# Patient Record
Sex: FEMALE | Race: ASIAN | Employment: OTHER | URBAN - METROPOLITAN AREA
[De-identification: names, ages, dates, MRNs, and addresses within clinical notes are randomized per-mention and may not be internally consistent; named-entity substitution may affect disease eponyms.]

---

## 2017-01-12 ENCOUNTER — OFFICE VISIT (OUTPATIENT)
Dept: UROLOGY | Facility: HOSPITAL | Age: 72
End: 2017-01-12
Attending: OBSTETRICS & GYNECOLOGY
Payer: MEDICARE

## 2017-01-12 VITALS
HEIGHT: 58.5 IN | SYSTOLIC BLOOD PRESSURE: 110 MMHG | WEIGHT: 111 LBS | DIASTOLIC BLOOD PRESSURE: 70 MMHG | BODY MASS INDEX: 22.68 KG/M2

## 2017-01-12 DIAGNOSIS — N95.2 POSTMENOPAUSAL ATROPHIC VAGINITIS: ICD-10-CM

## 2017-01-12 DIAGNOSIS — N81.2 UTEROVAGINAL PROLAPSE, INCOMPLETE: Primary | ICD-10-CM

## 2017-01-12 PROCEDURE — 99211 OFF/OP EST MAY X REQ PHY/QHP: CPT

## 2017-01-12 NOTE — PROGRESS NOTES
.Patient here for pessary check, denies any c/o leakage, vaginal spotting/discharge. Patient happy with pessary. #3 incontinence dish  pessary removed and cleaned. Speculum exam revealed pink moist tissue, no open areas or lesions noted.   Lavage with ha

## 2017-02-02 ENCOUNTER — OFFICE VISIT (OUTPATIENT)
Dept: UROLOGY | Facility: HOSPITAL | Age: 72
End: 2017-02-02
Attending: OBSTETRICS & GYNECOLOGY
Payer: MEDICARE

## 2017-02-02 VITALS
DIASTOLIC BLOOD PRESSURE: 70 MMHG | BODY MASS INDEX: 22.68 KG/M2 | WEIGHT: 111 LBS | SYSTOLIC BLOOD PRESSURE: 126 MMHG | HEIGHT: 58.5 IN

## 2017-02-02 DIAGNOSIS — N39.3 FEMALE STRESS INCONTINENCE: ICD-10-CM

## 2017-02-02 DIAGNOSIS — N81.2 UTEROVAGINAL PROLAPSE, INCOMPLETE: Primary | ICD-10-CM

## 2017-02-02 PROCEDURE — 99211 OFF/OP EST MAY X REQ PHY/QHP: CPT

## 2017-02-02 NOTE — PROCEDURES
.Patient here for pessary check, pessary fell out after bm this week, reports having some constipation recently, taking metamucil daily, reviewed bowel regimen and addition of Miralax when constipated.   Pt  denies any c/o leakage, vaginal spotting/discharg

## 2017-02-02 NOTE — PATIENT INSTRUCTIONS
[id number] [address] [facility]IC[facility]    BOWEL REGIMEN    Constipation can have detrimental effects on bladder function and can worsen the symptoms of prolapse. It is important to avoid constipation.     The first step for treating constipation is to i

## 2017-02-27 ENCOUNTER — TELEPHONE (OUTPATIENT)
Dept: FAMILY MEDICINE CLINIC | Facility: CLINIC | Age: 72
End: 2017-02-27

## 2017-02-27 NOTE — TELEPHONE ENCOUNTER
FYI - Son called and asked for Thursday appt to be moved to earlier time - 9 or 9:30. Appt was MA Supervisit with blood pressure check and med refill. Pt will be out of town for next 3 months.

## 2017-02-27 NOTE — TELEPHONE ENCOUNTER
Future Appointments    Patient Cancelled (Requested an appt around 9 or 9:30, if no appt, needed to cancel )

## 2017-03-02 ENCOUNTER — OFFICE VISIT (OUTPATIENT)
Dept: UROLOGY | Facility: HOSPITAL | Age: 72
End: 2017-03-02
Attending: OBSTETRICS & GYNECOLOGY
Payer: MEDICARE

## 2017-03-02 VITALS
DIASTOLIC BLOOD PRESSURE: 64 MMHG | WEIGHT: 111 LBS | BODY MASS INDEX: 22.68 KG/M2 | HEIGHT: 58.5 IN | SYSTOLIC BLOOD PRESSURE: 132 MMHG

## 2017-03-02 DIAGNOSIS — N81.2 UTEROVAGINAL PROLAPSE, INCOMPLETE: Primary | ICD-10-CM

## 2017-03-02 DIAGNOSIS — N95.2 POSTMENOPAUSAL ATROPHIC VAGINITIS: ICD-10-CM

## 2017-03-02 PROCEDURE — 99211 OFF/OP EST MAY X REQ PHY/QHP: CPT

## 2017-03-02 NOTE — PATIENT INSTRUCTIONS
[address] [address] [facility]IC[facility]    BOWEL REGIMEN    Constipation can have detrimental effects on bladder function and can worsen the symptoms of prolapse. It is important to avoid constipation.     The first step for treating constipation is to i

## 2017-03-02 NOTE — PROCEDURES
.Patient here for pessary check, denies any c/o leakage, vaginal spotting/discharge. Patient happy with pessary. Pt removed pessary on own prior to visit to practice. Marcelino Orta Speculum exam revealed pink moist tissue, no open areas or lesions noted.   Scant oozi

## 2017-03-10 ENCOUNTER — PATIENT OUTREACH (OUTPATIENT)
Dept: FAMILY MEDICINE CLINIC | Facility: CLINIC | Age: 72
End: 2017-03-10

## 2017-03-10 NOTE — PROGRESS NOTES
Patient outreach initiated to schedule MAPS. Called home number and left message to call office.      Last MAPS: 5/19/16    [phone] (home)

## 2017-03-21 ENCOUNTER — TELEPHONE (OUTPATIENT)
Dept: FAMILY MEDICINE CLINIC | Facility: CLINIC | Age: 72
End: 2017-03-21

## 2017-03-21 DIAGNOSIS — I10 ESSENTIAL HYPERTENSION WITH GOAL BLOOD PRESSURE LESS THAN 140/90: Primary | ICD-10-CM

## 2017-03-22 RX ORDER — AMLODIPINE BESYLATE 5 MG/1
5 TABLET ORAL DAILY
Qty: 30 TABLET | Refills: 0 | Status: SHIPPED | OUTPATIENT
Start: 2017-03-22 | End: 2017-06-14

## 2017-03-22 NOTE — TELEPHONE ENCOUNTER
No future appointments. Return in about 3 months (around 3/1/2017). LOV 12/16     LAST LAB 9/16     LAST RX   AmLODIPine Besylate 5 MG Oral Tab 90 tablet 0 12/1/2016         PROTOCOL  Hypertension Medications Protocol Passed    Please advise on refill.

## 2017-03-23 NOTE — TELEPHONE ENCOUNTER
Called patient to schedule an appointment and son (okay to speak with per Lakesha Mina) stated that Sheila Bermeo is out of the state for 2 more months. She will make an appointment when she returns. He would like to know if we can call in refill for 2 months?   He stat

## 2017-03-28 NOTE — PROGRESS NOTES
Patient outreach to schedule MAPS. LVM on son's voice mail. I know the patient is supposed to be out of town - reaching out to see when she will return.

## 2017-04-03 NOTE — TELEPHONE ENCOUNTER
No future appointments. LOV 12/16    LAST DEXA 4/15  Due now. LAST RX    ALENDRONATE SODIUM 70 MG Oral Tab 12 tablet 1 10/13/2016       PROTOCOL  ALENDRONATE SODIUM 70 MG Oral Tab 12 tablet 1 10/13/2016       Note Pharmacy [facility].      Will give 2 ref

## 2017-04-04 RX ORDER — ALENDRONATE SODIUM 70 MG/1
TABLET ORAL
Qty: 4 TABLET | Refills: 1 | Status: SHIPPED | OUTPATIENT
Start: 2017-04-04 | End: 2017-04-04

## 2017-04-04 RX ORDER — ALENDRONATE SODIUM 70 MG/1
TABLET ORAL
Qty: 12 TABLET | Refills: 1 | Status: SHIPPED | OUTPATIENT
Start: 2017-04-04 | End: 2017-06-21

## 2017-04-04 NOTE — TELEPHONE ENCOUNTER
Did refill x 2 months.       Ifrah Ryan at 3/23/2017  1:30 PM      Status: Signed : Elham King All Collapse All    Called patient to schedule an appointment and son (okay to speak with per [facility]) stated that Lasha Silverman is out of the state for

## 2017-05-22 RX ORDER — LEVOTHYROXINE SODIUM 0.03 MG/1
TABLET ORAL
Qty: 90 TABLET | Refills: 1 | Status: SHIPPED | OUTPATIENT
Start: 2017-05-22 | End: 2017-11-09

## 2017-05-22 NOTE — TELEPHONE ENCOUNTER
No future appointments. LOV 12/16     LAST LAB 11/16    LAST RX   Levothyroxine Sodium 25 MCG Oral Tab 90 tablet 3 5/19/2016       PROTOCOL  Thyroid Supplements Protocol Passed    Refilled x 6 months.

## 2017-06-15 RX ORDER — AMLODIPINE BESYLATE 5 MG/1
TABLET ORAL
Qty: 15 TABLET | Refills: 0 | Status: SHIPPED | OUTPATIENT
Start: 2017-06-15 | End: 2017-06-21

## 2017-06-15 NOTE — TELEPHONE ENCOUNTER
Future Appointments  Date Time Provider [facility]   6/21/2017 6:00 PM Kim Ackerman MD [facility] [address]       LOV  12/16    LAST LAB    LAST RX   AmLODIPine Besylate 5 MG Oral Tab 30 tablet 0 3/22/2017 6/20/2017      Sig :  Take 1 tablet (5 mg

## 2017-06-16 RX ORDER — AMLODIPINE BESYLATE 5 MG/1
TABLET ORAL
Qty: 90 TABLET | Refills: 0 | OUTPATIENT
Start: 2017-06-16

## 2017-06-16 NOTE — TELEPHONE ENCOUNTER
Future Appointments  Date Time Provider [facility]   6/21/2017 6:00 PM Dustin Mccall MD [facility] [address]     LOV    LAST LAB    LAST RX   AMLODIPINE BESYLATE 5 MG Oral Tab 15 tablet 0 6/15/2017         PROTOCOL  Hypertension Medications Jose

## 2017-06-21 ENCOUNTER — OFFICE VISIT (OUTPATIENT)
Dept: FAMILY MEDICINE CLINIC | Facility: CLINIC | Age: 72
End: 2017-06-21

## 2017-06-21 VITALS
BODY MASS INDEX: 22.88 KG/M2 | TEMPERATURE: 99 F | DIASTOLIC BLOOD PRESSURE: 84 MMHG | HEART RATE: 62 BPM | OXYGEN SATURATION: 98 % | SYSTOLIC BLOOD PRESSURE: 138 MMHG | WEIGHT: 112 LBS | HEIGHT: 58.5 IN | RESPIRATION RATE: 14 BRPM

## 2017-06-21 DIAGNOSIS — N81.10 FEMALE BLADDER PROLAPSE: ICD-10-CM

## 2017-06-21 DIAGNOSIS — D50.8 IRON DEFICIENCY ANEMIA SECONDARY TO INADEQUATE DIETARY IRON INTAKE: ICD-10-CM

## 2017-06-21 DIAGNOSIS — K21.9 GASTROESOPHAGEAL REFLUX DISEASE WITHOUT ESOPHAGITIS: ICD-10-CM

## 2017-06-21 DIAGNOSIS — Z00.00 ENCOUNTER FOR ANNUAL HEALTH EXAMINATION: Primary | ICD-10-CM

## 2017-06-21 DIAGNOSIS — S42.302D: ICD-10-CM

## 2017-06-21 DIAGNOSIS — Z12.31 VISIT FOR SCREENING MAMMOGRAM: ICD-10-CM

## 2017-06-21 DIAGNOSIS — I10 ESSENTIAL HYPERTENSION, BENIGN: ICD-10-CM

## 2017-06-21 DIAGNOSIS — Z12.11 SCREENING FOR COLON CANCER: ICD-10-CM

## 2017-06-21 DIAGNOSIS — Z78.0 POSTMENOPAUSAL: ICD-10-CM

## 2017-06-21 DIAGNOSIS — H91.93 HEARING DIFFICULTY OF BOTH EARS: ICD-10-CM

## 2017-06-21 DIAGNOSIS — Z13.6 SCREENING FOR CARDIOVASCULAR CONDITION: ICD-10-CM

## 2017-06-21 DIAGNOSIS — M85.80 OSTEOPENIA, UNSPECIFIED LOCATION: ICD-10-CM

## 2017-06-21 DIAGNOSIS — E03.9 HYPOTHYROIDISM, UNSPECIFIED TYPE: ICD-10-CM

## 2017-06-21 PROCEDURE — G0439 PPPS, SUBSEQ VISIT: HCPCS | Performed by: FAMILY MEDICINE

## 2017-06-21 PROCEDURE — 96160 PT-FOCUSED HLTH RISK ASSMT: CPT | Performed by: FAMILY MEDICINE

## 2017-06-21 RX ORDER — ALENDRONATE SODIUM 70 MG/1
TABLET ORAL
Qty: 12 TABLET | Refills: 3 | Status: SHIPPED | OUTPATIENT
Start: 2017-06-21 | End: 2018-02-01

## 2017-06-21 RX ORDER — AMLODIPINE BESYLATE 5 MG/1
5 TABLET ORAL
Qty: 90 TABLET | Refills: 1 | Status: SHIPPED | OUTPATIENT
Start: 2017-06-21 | End: 2018-02-01

## 2017-06-21 NOTE — PATIENT INSTRUCTIONS
Jane Sleeper SCREENING SCHEDULE   Tests on this list are recommended by your physician but may not be covered, or covered at this frequency, by your insurer. Please check with your insurance carrier before scheduling to verify coverage.    PREVENT Colorectal Cancer Screening  Covered up to Age 76     Colonoscopy Screen   Covered every 10 years- more often if abnormal Colonoscopy,10 Years due on 01/20/2021 Update Health Maintenance if applicable    Flex Sigmoidoscopy Screen  Covered every 5 years N VACC PRSV FREE INC ANTIG   -ADMIN INFLUENZA VIRUS VAC    Please get every year    Pneumococcal 13 (Prevnar)  Covered Once after 65   Orders placed or performed in visit on 10/15/15  -PNEUMOCOCCAL VACC, 13 SREE IM    Please get once after your 65th birthday Directives.

## 2017-06-21 NOTE — PROGRESS NOTES
Patient presents with: Annual: maps  Medication Follow-Up    HPI:   Bobbi Anton is a 66year old female who presents for a MA (Medicare Advantage) Supervisit (Once per calendar year).     Fractured left arm 3 weeks ago while visiting her daughter in (VITAMIN D3) 1000 UNITS Oral Tab Take 1,000 Units by mouth daily. Disp:  Rfl:    Estradiol (ESTRACE) 0.1 MG/GM Vaginal Cream Apply 1/2 gram vaginally 2-3 times per week.  Disp: 42.5 g Rfl: 3   psyllium (METAMUCIL SMOOTH TEXTURE) 28 % Oral Powd Pack Take 1 p anemia  ENDOCRINE: denies thyroid history  ALL/ASTHMA: denies hx of allergy or asthma    EXAM:   /84 mmHg  Pulse 62  Temp(Src) 98.7 °F (37.1 °C) (Temporal)  Resp 14  Ht 58.5\"  Wt 112 lb  BMI 23.01 kg/m2  SpO2 98% Estimated body mass index is 23.01 k and all orders for this visit:    Encounter for annual health examination  -     COMP METABOLIC PANEL (14); Future  -     CBC WITH DIFFERENTIAL WITH PLATELET; Future    Screening for cardiovascular condition  -     LIPID PANEL;  Future    Visit for screenin MD Marshall, 6/21/2017     General Health     In the past six months, have you lost more than 10 pounds without trying?: 2 - No    Has your appetite been poor?: No    How does the patient maintain a good energy level?: Daily Walks    How would you describ depressed, or hopeless (over the last two weeks)?: Not at all    PHQ-2 SCORE: 0        Advance Directives     Do you have a healthcare power of ?: Yes    Do you have a living will?: Yes     Please go to \"Cognitive Assessment\" under Medicare Asses yrs age 33-67, age 72 and older at high risk There are no preventive care reminders to display for this patient. Update Health Maintenance if applicable    Chlamydia  Annually if high risk No results found for: CHLAMYDIA No flowsheet data found.     Sinan Scott BLOOD UREA NITROGEN (mg/dL)   Date Value   06/27/2009 9    No flowsheet data found. Drug Serum Conc  Annually No results found for: DIGOXIN, DIG, VALP No flowsheet data found.     Diabetes      HgbA1C  Annually No results found for: A1C No flowsheet

## 2017-06-23 ENCOUNTER — TELEPHONE (OUTPATIENT)
Dept: FAMILY MEDICINE CLINIC | Facility: CLINIC | Age: 72
End: 2017-06-23

## 2017-06-23 NOTE — TELEPHONE ENCOUNTER
Pt cannot locate visit summary - needs name of Referral.  I see a Dr Nita Julien given; however, address, clinic & telephone number are not listed in Referrals. Pls call. Transferred to Triage .

## 2017-06-23 NOTE — TELEPHONE ENCOUNTER
Referral Information      Referred to Address [facility]     Domenico Mares [address] [phone]         Always look under the \"other order\" tab for this type of info! Can you please contact pt?

## 2017-06-28 PROBLEM — I10 ESSENTIAL HYPERTENSION, BENIGN: Status: ACTIVE | Noted: 2017-06-28

## 2017-08-02 ENCOUNTER — HOSPITAL ENCOUNTER (OUTPATIENT)
Dept: OCCUPATIONAL MEDICINE | Facility: HOSPITAL | Age: 72
Setting detail: THERAPIES SERIES
Discharge: HOME | End: 2017-08-02
Attending: ORTHOPAEDIC SURGERY
Payer: MEDICARE

## 2017-08-02 DIAGNOSIS — S62.102A WRIST FRACTURE, LEFT, CLOSED, INITIAL ENCOUNTER: ICD-10-CM

## 2017-08-02 PROCEDURE — 97165 OT EVAL LOW COMPLEX 30 MIN: CPT

## 2017-08-02 NOTE — PROGRESS NOTES
OCCUPATIONAL THERAPY UPPER EXTREMITY EVALUATION   Referring Physician: Dr. Serjio Lopez  Diagnosis:  dorsal angulation of the  L distal radius fracture     Date of Service: 8/2/2017     PATIENT SUMMARY   Jenifer Drew is a 67year old y/o female R=WNL , L=15      AROM/PROM:(Degrees)  LEFT HAND:    Thumb IF MF RF SF   MP 0-73 0-65 0-70 0-75 0-70   PIP 0-45 0-82 0-82 0-80 0-70   DIP  0-50 0-65 0-63 0-60    197 217 218 200         MANUAL MUSCLE TESTING:Deferred d/t pain    Strength (lbs) Right planning and for this course of care. Thank you for your referral. Please co-sign or sign and return this letter via fax as soon as possible to [phone].  If you have any questions, please contact me at Dept: [phone]    Sincerely,  Electronicall

## 2017-08-09 ENCOUNTER — APPOINTMENT (OUTPATIENT)
Dept: OCCUPATIONAL MEDICINE | Facility: HOSPITAL | Age: 72
End: 2017-08-09
Payer: MEDICARE

## 2017-08-10 ENCOUNTER — HOSPITAL ENCOUNTER (OUTPATIENT)
Dept: OCCUPATIONAL MEDICINE | Facility: HOSPITAL | Age: 72
Setting detail: THERAPIES SERIES
Discharge: HOME | End: 2017-08-10
Attending: ORTHOPAEDIC SURGERY
Payer: MEDICARE

## 2017-08-10 PROCEDURE — 97110 THERAPEUTIC EXERCISES: CPT

## 2017-08-10 PROCEDURE — 97140 MANUAL THERAPY 1/> REGIONS: CPT

## 2017-08-10 NOTE — PROGRESS NOTES
Dx: dorsal angulation of the  L distal radius fracture         Authorized # of Visits: 2/12         Next MD visit: none scheduled  Fall Risk: standard         Precautions: n/a             Subjective:   Pt reports she is beginning to wash light weight dishe Beige twist stik wrist flx/ext 3 min         Composite flx with wrist ROM         Tendon glides L  X 12         Kleenex uln rad dev ex.  12x2         Pro/sup x12 L FA                  Skilled Services: manual therapy    Charges: 1x MT, 2x TE       Total

## 2017-08-17 ENCOUNTER — HOSPITAL ENCOUNTER (OUTPATIENT)
Dept: OCCUPATIONAL MEDICINE | Facility: HOSPITAL | Age: 72
Setting detail: THERAPIES SERIES
Discharge: HOME | End: 2017-08-17
Attending: ORTHOPAEDIC SURGERY
Payer: MEDICARE

## 2017-08-17 PROCEDURE — 97110 THERAPEUTIC EXERCISES: CPT

## 2017-08-17 PROCEDURE — 97140 MANUAL THERAPY 1/> REGIONS: CPT

## 2017-08-17 NOTE — PROGRESS NOTES
Dx: dorsal angulation of the  L distal radius fracture         Authorized # of Visits: 3/12         Next MD visit: none scheduled  Fall Risk: standard         Precautions: n/a             Subjective:   Pt reports she continues to have difficulty using L ha min Yellow sponge squeeze full  and hook gripp 12xs each        Yellow/red clothespins on off L pinch 10x 2 each Wrist ext sponge press on table 5 xs too painful.         Squeezing yellow sponge 2xs 10 composite  Clear plastic twist stik for L wrist

## 2017-08-31 ENCOUNTER — OFFICE VISIT (OUTPATIENT)
Dept: UROLOGY | Facility: HOSPITAL | Age: 72
End: 2017-08-31
Attending: OBSTETRICS & GYNECOLOGY
Payer: MEDICARE

## 2017-08-31 VITALS
SYSTOLIC BLOOD PRESSURE: 102 MMHG | DIASTOLIC BLOOD PRESSURE: 66 MMHG | BODY MASS INDEX: 22.88 KG/M2 | WEIGHT: 112 LBS | HEIGHT: 58.5 IN

## 2017-08-31 DIAGNOSIS — N81.2 UTEROVAGINAL PROLAPSE, INCOMPLETE: ICD-10-CM

## 2017-08-31 DIAGNOSIS — N95.2 POSTMENOPAUSAL ATROPHIC VAGINITIS: Primary | ICD-10-CM

## 2017-08-31 DIAGNOSIS — N81.11 MIDLINE CYSTOCELE: ICD-10-CM

## 2017-08-31 PROCEDURE — 99211 OFF/OP EST MAY X REQ PHY/QHP: CPT

## 2017-08-31 NOTE — PROCEDURES
.Patient here for pessary check, denies any c/o leakage, vaginal spotting/discharge. Patient saw RN in [place] for pessary check while on vacation, pessary was changed to old pessary, #4 ring with support.   Pt reports the incontinence dish fell out and s

## 2017-09-12 RX ORDER — ESTRADIOL 0.1 MG/G
CREAM VAGINAL
Qty: 42.5 G | Refills: 0 | Status: SHIPPED | OUTPATIENT
Start: 2017-09-12 | End: 2018-05-16

## 2017-09-12 NOTE — TELEPHONE ENCOUNTER
Pt needs apt with physician in November, can only have one refill, call the office to make yearly apt with physician for additional refills

## 2017-11-10 RX ORDER — LEVOTHYROXINE SODIUM 0.03 MG/1
TABLET ORAL
Qty: 90 TABLET | Refills: 1 | Status: SHIPPED | OUTPATIENT
Start: 2017-11-10 | End: 2017-12-07 | Stop reason: DRUGHIGH

## 2017-11-10 NOTE — TELEPHONE ENCOUNTER
Future Appointments  Date Time Provider [facility]   11/30/2017 11:00 AM RN [address] Joan PHILIP 6/17    LAST LAB 11/16    LAST RX   LEVOTHYROXINE SODIUM 25 MCG Oral Tab 90 tablet 1 5/22/2017         PROTOCOL  Thyroid Supplements Protocol

## 2017-11-28 ENCOUNTER — TELEPHONE (OUTPATIENT)
Dept: FAMILY MEDICINE CLINIC | Facility: CLINIC | Age: 72
End: 2017-11-28

## 2017-11-28 DIAGNOSIS — E03.9 HYPOTHYROIDISM, UNSPECIFIED TYPE: Primary | ICD-10-CM

## 2017-11-28 NOTE — TELEPHONE ENCOUNTER
Dr Gonzales Michel the labs ordered in June were for MAPS    Pt has not completed. Last TSH was Nov 2016    Is pt due for thyroid F/U? Additional labs to be added?

## 2017-11-28 NOTE — TELEPHONE ENCOUNTER
TSH and Free T4 added to labs, please have patient check her labs, please advise son if labs abnormal may need to follow up.

## 2017-11-28 NOTE — TELEPHONE ENCOUNTER
Left detailed message for patient/son  on phone. Return in 6 months (on 12/21/2017). Call reception for appointment for f/u labs and Rx when PCP is back.

## 2017-11-28 NOTE — TELEPHONE ENCOUNTER
Son called and is concerned about his Mom's thyroid function. Explained Labs were ordered back in June - and that he can take his mom to any [facility]. He called back again to ask if Dr Oreilly can ADD a Thyroid function test to current Labs.       (He is

## 2017-11-30 ENCOUNTER — LAB ENCOUNTER (OUTPATIENT)
Dept: LAB | Age: 72
End: 2017-11-30
Attending: FAMILY MEDICINE
Payer: MEDICARE

## 2017-11-30 ENCOUNTER — OFFICE VISIT (OUTPATIENT)
Dept: UROLOGY | Facility: HOSPITAL | Age: 72
End: 2017-11-30
Attending: OBSTETRICS & GYNECOLOGY
Payer: MEDICARE

## 2017-11-30 VITALS — HEIGHT: 58.5 IN | WEIGHT: 106.38 LBS | BODY MASS INDEX: 21.73 KG/M2

## 2017-11-30 DIAGNOSIS — N95.2 POSTMENOPAUSAL ATROPHIC VAGINITIS: ICD-10-CM

## 2017-11-30 DIAGNOSIS — E03.9 HYPOTHYROIDISM, UNSPECIFIED TYPE: ICD-10-CM

## 2017-11-30 DIAGNOSIS — Z13.6 SCREENING FOR CARDIOVASCULAR CONDITION: ICD-10-CM

## 2017-11-30 DIAGNOSIS — Z00.00 ENCOUNTER FOR ANNUAL HEALTH EXAMINATION: ICD-10-CM

## 2017-11-30 DIAGNOSIS — D50.8 IRON DEFICIENCY ANEMIA SECONDARY TO INADEQUATE DIETARY IRON INTAKE: ICD-10-CM

## 2017-11-30 DIAGNOSIS — N81.2 UTEROVAGINAL PROLAPSE, INCOMPLETE: Primary | ICD-10-CM

## 2017-11-30 DIAGNOSIS — N81.11 MIDLINE CYSTOCELE: ICD-10-CM

## 2017-11-30 PROCEDURE — 36415 COLL VENOUS BLD VENIPUNCTURE: CPT

## 2017-11-30 PROCEDURE — 84443 ASSAY THYROID STIM HORMONE: CPT

## 2017-11-30 PROCEDURE — 85025 COMPLETE CBC W/AUTO DIFF WBC: CPT

## 2017-11-30 PROCEDURE — 99211 OFF/OP EST MAY X REQ PHY/QHP: CPT

## 2017-11-30 PROCEDURE — 80053 COMPREHEN METABOLIC PANEL: CPT

## 2017-11-30 PROCEDURE — 84439 ASSAY OF FREE THYROXINE: CPT

## 2017-11-30 PROCEDURE — 80061 LIPID PANEL: CPT

## 2017-11-30 PROCEDURE — 83540 ASSAY OF IRON: CPT

## 2017-11-30 PROCEDURE — 83550 IRON BINDING TEST: CPT

## 2017-11-30 PROCEDURE — 82728 ASSAY OF FERRITIN: CPT

## 2017-11-30 NOTE — PROCEDURES
..Patient here for pessary check, denies any c/o leakage, vaginal spotting/discharge. Patient happy with pessary. Ring with support pessary removed and cleaned. Speculum exam revealed pink moist tissue, no open areas or lesions noted.   Pessary replaced

## 2017-12-07 ENCOUNTER — TELEPHONE (OUTPATIENT)
Dept: FAMILY MEDICINE CLINIC | Facility: CLINIC | Age: 72
End: 2017-12-07

## 2017-12-07 DIAGNOSIS — E03.4 HYPOTHYROIDISM DUE TO ACQUIRED ATROPHY OF THYROID: Primary | ICD-10-CM

## 2017-12-07 RX ORDER — LEVOTHYROXINE SODIUM 0.05 MG/1
50 TABLET ORAL
Qty: 30 TABLET | Refills: 1 | Status: SHIPPED | OUTPATIENT
Start: 2017-12-07 | End: 2018-02-01

## 2017-12-07 RX ORDER — LEVOTHYROXINE SODIUM 0.03 MG/1
25 TABLET ORAL
Qty: 30 TABLET | Refills: 1 | Status: CANCELLED | OUTPATIENT
Start: 2017-12-07

## 2017-12-07 RX ORDER — LEVOTHYROXINE SODIUM 0.05 MG/1
TABLET ORAL
Qty: 90 TABLET | Refills: 1 | OUTPATIENT
Start: 2017-12-07

## 2017-12-07 NOTE — TELEPHONE ENCOUNTER
Son called for mom. Received call about Thyroid labs. She is experiencing mild symptoms. Pls increase medication.

## 2017-12-07 NOTE — TELEPHONE ENCOUNTER
Viewed by Wilder Collazo on 12/6/2017  4:19 PM   Written by David Fernández LPN on 23/8/7440  2:12 AM   Poli Nguyễn MD     Thyroid function is within normal limits but not optimal, if symptomatic can increase the dose of levothyroxine.  To

## 2018-01-25 ENCOUNTER — APPOINTMENT (OUTPATIENT)
Dept: LAB | Age: 73
End: 2018-01-25
Attending: FAMILY MEDICINE
Payer: MEDICARE

## 2018-01-25 DIAGNOSIS — E03.4 HYPOTHYROIDISM DUE TO ACQUIRED ATROPHY OF THYROID: ICD-10-CM

## 2018-01-25 LAB
FREE T4: 1 NG/DL (ref 0.9–1.8)
TSI SER-ACNC: 2.92 MIU/ML (ref 0.35–5.5)

## 2018-01-25 PROCEDURE — 36415 COLL VENOUS BLD VENIPUNCTURE: CPT

## 2018-01-25 PROCEDURE — 84439 ASSAY OF FREE THYROXINE: CPT

## 2018-01-25 PROCEDURE — 84443 ASSAY THYROID STIM HORMONE: CPT

## 2018-02-01 ENCOUNTER — OFFICE VISIT (OUTPATIENT)
Dept: FAMILY MEDICINE CLINIC | Facility: CLINIC | Age: 73
End: 2018-02-01

## 2018-02-01 ENCOUNTER — PATIENT MESSAGE (OUTPATIENT)
Dept: FAMILY MEDICINE CLINIC | Facility: CLINIC | Age: 73
End: 2018-02-01

## 2018-02-01 VITALS
WEIGHT: 106 LBS | DIASTOLIC BLOOD PRESSURE: 68 MMHG | HEART RATE: 78 BPM | OXYGEN SATURATION: 99 % | TEMPERATURE: 99 F | HEIGHT: 58 IN | BODY MASS INDEX: 22.25 KG/M2 | RESPIRATION RATE: 14 BRPM | SYSTOLIC BLOOD PRESSURE: 122 MMHG

## 2018-02-01 DIAGNOSIS — M85.80 OSTEOPENIA, UNSPECIFIED LOCATION: ICD-10-CM

## 2018-02-01 DIAGNOSIS — Z12.31 VISIT FOR SCREENING MAMMOGRAM: ICD-10-CM

## 2018-02-01 DIAGNOSIS — G89.29 CHRONIC MIDLINE LOW BACK PAIN WITHOUT SCIATICA: ICD-10-CM

## 2018-02-01 DIAGNOSIS — Z12.11 SCREENING FOR COLON CANCER: ICD-10-CM

## 2018-02-01 DIAGNOSIS — Z13.6 SCREENING FOR CARDIOVASCULAR CONDITION: ICD-10-CM

## 2018-02-01 DIAGNOSIS — D50.8 IRON DEFICIENCY ANEMIA SECONDARY TO INADEQUATE DIETARY IRON INTAKE: ICD-10-CM

## 2018-02-01 DIAGNOSIS — H91.93 HEARING DIFFICULTY OF BOTH EARS: ICD-10-CM

## 2018-02-01 DIAGNOSIS — Z00.00 ENCOUNTER FOR ANNUAL HEALTH EXAMINATION: Primary | ICD-10-CM

## 2018-02-01 DIAGNOSIS — E03.9 HYPOTHYROIDISM, UNSPECIFIED TYPE: ICD-10-CM

## 2018-02-01 DIAGNOSIS — I10 ESSENTIAL HYPERTENSION, BENIGN: ICD-10-CM

## 2018-02-01 DIAGNOSIS — Z78.0 POSTMENOPAUSAL: ICD-10-CM

## 2018-02-01 DIAGNOSIS — N81.10 FEMALE BLADDER PROLAPSE: ICD-10-CM

## 2018-02-01 DIAGNOSIS — M54.50 CHRONIC MIDLINE LOW BACK PAIN WITHOUT SCIATICA: ICD-10-CM

## 2018-02-01 PROCEDURE — G0439 PPPS, SUBSEQ VISIT: HCPCS | Performed by: FAMILY MEDICINE

## 2018-02-01 PROCEDURE — 96160 PT-FOCUSED HLTH RISK ASSMT: CPT | Performed by: FAMILY MEDICINE

## 2018-02-01 RX ORDER — LEVOTHYROXINE SODIUM 0.05 MG/1
50 TABLET ORAL
Qty: 90 TABLET | Refills: 2 | Status: SHIPPED | OUTPATIENT
Start: 2018-02-01 | End: 2019-02-17

## 2018-02-01 RX ORDER — ALENDRONATE SODIUM 70 MG/1
TABLET ORAL
Qty: 12 TABLET | Refills: 3 | Status: SHIPPED | OUTPATIENT
Start: 2018-02-01 | End: 2019-02-28

## 2018-02-01 RX ORDER — AMLODIPINE BESYLATE 5 MG/1
5 TABLET ORAL
Qty: 90 TABLET | Refills: 3 | Status: SHIPPED | OUTPATIENT
Start: 2018-02-01 | End: 2019-02-28

## 2018-02-01 RX ORDER — LEVOTHYROXINE SODIUM 0.03 MG/1
25 TABLET ORAL
Qty: 90 TABLET | Refills: 2 | Status: SHIPPED | OUTPATIENT
Start: 2018-02-01 | End: 2019-02-17

## 2018-02-01 NOTE — PATIENT INSTRUCTIONS
04-May-2017 02:40 [facility] SCREENING SCHEDULE   Tests on this list are recommended by your physician but may not be covered, or covered at this frequency, by your insurer. Please check with your insurance carrier before scheduling to verify coverage.    PREVENT cigarettes in their lifetime   • Anyone with a family history    Colorectal Cancer Screening  Covered up to Age 76     Colonoscopy Screen   Covered every 10 years- more often if abnormal Colonoscopy,10 Years due on 01/20/2021 Update Health Maintenance if a ANTIG   Orders placed or performed in visit on 11/06/14  -FLU VACC PRSV FREE INC ANTIG   -ADMIN INFLUENZA VIRUS VAC    Please get every year    Pneumococcal 13 (Prevnar)  Covered Once after 65   Orders placed or performed in visit on 10/15/15  -Say Amaya information from the [address] [place] [address] regarding Advance Directives. 04-May-2017 02:50

## 2018-02-01 NOTE — PROGRESS NOTES
Patient presents with: Well Adult: MAPS    HPI:   Wilder Collazo is a 67year old female who presents for a MA (Medicare Advantage) [facility] (Once per calendar year).     Hypothyroidism: Son who is accompanying mother states that she has been taking functional status.    Difficulty walking?: Yes             Depression Screening (PHQ-2/PHQ-9): Over the LAST 2 WEEKS   Little interest or pleasure in doing things (over the last two weeks)?: Not at all  Feeling down, depressed, or hopeless (over the last tw Buddy Gutierrez MD:  Levothyroxine Sodium 50 MCG Oral Tab Take 1 tablet (50 mcg total) by mouth before breakfast.   ESTRACE 0.1 MG/GM Vaginal Cream APPLY 1/2 GRAM VAGINALLY TWO TO THREE TIMES PER WEEK   Alendronate Sodium 70 MG Oral Tab TAKE 1 TABLET BY denies hx of anemia  ENDOCRINE: denies thyroid history  ALL/ASTHMA: denies hx of allergy or asthma    EXAM:   /68   Pulse 78   Temp 98.5 °F (36.9 °C) (Temporal)   Resp 14   Ht 58\"   Wt 106 lb   SpO2 99%   BMI 22.15 kg/m²  Estimated body mass index i ASSESSMENT AND OTHER RELEVANT CHRONIC CONDITIONS:   Randall Hopper is a 67year old female who presents for a Medicare Assessment. Rodolfomoy Gordon was seen today for well adult.     Diagnoses and all orders for this visit:    Encounter for annual health e Appropriate Exercise;Daily Walks  How would you describe your daily physical activity?: Light  How would you describe your current health state?: Good  How do you maintain positive mental well-being?: Visiting Family; Visiting Friends;Social Interaction discussed Oren Sandifer due on 06/16/2016 Update Health Maintenance if applicable     Immunizations (Update Immunization Activity if applicable)     Influenza  Covered Annually 10/15/2015 Please get every year    Pneumococcal 13 (Prevnar)  Covered Once af

## 2018-02-02 PROBLEM — G89.29 CHRONIC MIDLINE LOW BACK PAIN WITHOUT SCIATICA: Status: ACTIVE | Noted: 2018-02-02

## 2018-02-02 PROBLEM — M54.50 CHRONIC MIDLINE LOW BACK PAIN WITHOUT SCIATICA: Status: ACTIVE | Noted: 2018-02-02

## 2018-02-02 NOTE — TELEPHONE ENCOUNTER
From: Manish Ford  To: Enoc Weber MD  Sent: 2/1/2018 10:02 PM CST  Subject: Visit Follow-up Question    ,    My mom is concerned about her back pain.  After our discussion today, she wanted to ask you if she can go for an Lata or

## 2018-02-05 ENCOUNTER — PATIENT MESSAGE (OUTPATIENT)
Dept: FAMILY MEDICINE CLINIC | Facility: CLINIC | Age: 73
End: 2018-02-05

## 2018-02-05 NOTE — TELEPHONE ENCOUNTER
From: Larry Redmond  To: Caridad Casey MD  Sent: 2/5/2018 7:38 AM CST  Subject: Visit Follow-up Question    Thank you Dr. Saúl Briggs for authorizing X-ray for my mom Marilia Ann.  Thinking back, would X-ray rule out conditions better than MRI or CT when

## 2018-02-12 ENCOUNTER — PATIENT MESSAGE (OUTPATIENT)
Dept: FAMILY MEDICINE CLINIC | Facility: CLINIC | Age: 73
End: 2018-02-12

## 2018-02-12 DIAGNOSIS — G89.29 CHRONIC BILATERAL LOW BACK PAIN WITHOUT SCIATICA: Primary | ICD-10-CM

## 2018-02-12 DIAGNOSIS — M54.50 CHRONIC BILATERAL LOW BACK PAIN WITHOUT SCIATICA: Primary | ICD-10-CM

## 2018-02-12 NOTE — TELEPHONE ENCOUNTER
From: Nallely Cedeno  To: Marina Taveras MD  Sent: 2/12/2018 1:53 PM CST  Subject: Test Results Question    Thank you for your message Dr. Katarina Garcia.  With regard to arthritic changes of my mom’s back, she still complains of thaddeus after some time of wa

## 2018-03-08 ENCOUNTER — LAB ENCOUNTER (OUTPATIENT)
Dept: LAB | Facility: HOSPITAL | Age: 73
End: 2018-03-08
Attending: FAMILY MEDICINE
Payer: MEDICARE

## 2018-03-08 DIAGNOSIS — Z12.11 SCREENING FOR COLON CANCER: ICD-10-CM

## 2018-03-08 PROCEDURE — 82272 OCCULT BLD FECES 1-3 TESTS: CPT

## 2018-03-12 ENCOUNTER — OFFICE VISIT (OUTPATIENT)
Dept: PHYSICAL THERAPY | Age: 73
End: 2018-03-12
Attending: FAMILY MEDICINE
Payer: MEDICARE

## 2018-03-12 DIAGNOSIS — M54.50 CHRONIC BILATERAL LOW BACK PAIN WITHOUT SCIATICA: ICD-10-CM

## 2018-03-12 DIAGNOSIS — G89.29 CHRONIC BILATERAL LOW BACK PAIN WITHOUT SCIATICA: ICD-10-CM

## 2018-03-12 PROCEDURE — 97161 PT EVAL LOW COMPLEX 20 MIN: CPT

## 2018-03-12 PROCEDURE — 97110 THERAPEUTIC EXERCISES: CPT

## 2018-03-12 NOTE — PROGRESS NOTES
SPINE EVALUATION:   Referring Physician: Dr. Lillie Lora  Diagnosis: Chronic Bilateral LBP    Date of Service: 3/12/2018     PATIENT SUMMARY   Anu Nolan is a 67year old y/o female who presents to therapy today with complaints of chronic LBP for t 5/5   Knee extension: R 5/5; L 5/5  PF: R 5/5; L 5/5  DF: R 5/5; L 5/5     Flexibility:   LE   Hip Flexor: R WNL, L WNL( painful in calf)  Hamstrings: R WNL; L WNL  Piriformis: R WNL; L painful  Quads: R/L moderate tightness   Gastroc-soleus: R moderate ti please contact me at Dept: [phone]    Sincerely,  Electronically signed by therapist: Jean Mosley certification required: Yes  I certify the need for these services furnished under this plan of treatment and while under my care.

## 2018-03-15 ENCOUNTER — APPOINTMENT (OUTPATIENT)
Dept: PHYSICAL THERAPY | Age: 73
End: 2018-03-15
Attending: FAMILY MEDICINE
Payer: MEDICARE

## 2018-04-05 ENCOUNTER — OFFICE VISIT (OUTPATIENT)
Dept: PHYSICAL THERAPY | Age: 73
End: 2018-04-05
Attending: FAMILY MEDICINE
Payer: MEDICARE

## 2018-04-05 PROCEDURE — 97110 THERAPEUTIC EXERCISES: CPT

## 2018-04-05 PROCEDURE — 97112 NEUROMUSCULAR REEDUCATION: CPT

## 2018-04-05 NOTE — PROGRESS NOTES
Dx: Low back pain         Authorized # of Visits:  8         Next MD visit: none scheduled  Fall Risk: standard         Precautions: n/a             Subjective: Pain across the low back 2/10, no cramping in the left calf for the past 2 weeks, have been wor Treatment Time: 45 min

## 2018-04-12 ENCOUNTER — APPOINTMENT (OUTPATIENT)
Dept: PHYSICAL THERAPY | Age: 73
End: 2018-04-12
Attending: FAMILY MEDICINE
Payer: MEDICARE

## 2018-04-19 ENCOUNTER — APPOINTMENT (OUTPATIENT)
Dept: PHYSICAL THERAPY | Age: 73
End: 2018-04-19
Attending: FAMILY MEDICINE
Payer: MEDICARE

## 2018-04-19 ENCOUNTER — HOSPITAL ENCOUNTER (OUTPATIENT)
Dept: MAMMOGRAPHY | Age: 73
Discharge: HOME | End: 2018-04-19
Attending: FAMILY MEDICINE
Payer: MEDICARE

## 2018-04-19 DIAGNOSIS — Z12.31 VISIT FOR SCREENING MAMMOGRAM: ICD-10-CM

## 2018-04-19 PROCEDURE — 77067 SCR MAMMO BI INCL CAD: CPT | Performed by: FAMILY MEDICINE

## 2018-04-26 ENCOUNTER — APPOINTMENT (OUTPATIENT)
Dept: PHYSICAL THERAPY | Age: 73
End: 2018-04-26
Attending: FAMILY MEDICINE
Payer: MEDICARE

## 2018-05-03 ENCOUNTER — APPOINTMENT (OUTPATIENT)
Dept: PHYSICAL THERAPY | Age: 73
End: 2018-05-03
Attending: FAMILY MEDICINE
Payer: MEDICARE

## 2018-05-15 ENCOUNTER — TELEPHONE (OUTPATIENT)
Dept: FAMILY MEDICINE CLINIC | Facility: CLINIC | Age: 73
End: 2018-05-15

## 2018-05-15 DIAGNOSIS — R39.15 URGENCY OF URINATION: ICD-10-CM

## 2018-05-15 DIAGNOSIS — N95.2 POSTMENOPAUSAL ATROPHIC VAGINITIS: ICD-10-CM

## 2018-05-15 DIAGNOSIS — N81.2 UTEROVAGINAL PROLAPSE, INCOMPLETE: ICD-10-CM

## 2018-05-15 DIAGNOSIS — N39.3 FEMALE STRESS INCONTINENCE: Primary | ICD-10-CM

## 2018-05-15 NOTE — TELEPHONE ENCOUNTER
Regino from Uro/Gyn called - Pt need's URGENT referral for 2 visits. First visit is Thursday. Pt has Pessary - she needs to see Nurse and Phy visit  Needs Maintenance. [id number]  DX: N81.2  N95.2  N39.3  R39.15     Can we please get referral ASAP.

## 2018-05-15 NOTE — TELEPHONE ENCOUNTER
Dr Paola Lawrence. Called Dr Paola Lawrence. office to get referral information need details not just DX numbers. Can fax them gave information. Referral pended. Received information. Referral placed.

## 2018-05-16 RX ORDER — ESTRADIOL 0.1 MG/G
CREAM VAGINAL
Qty: 42.5 G | Refills: 3 | Status: SHIPPED | OUTPATIENT
Start: 2018-05-16 | End: 2020-01-30

## 2018-05-17 ENCOUNTER — OFFICE VISIT (OUTPATIENT)
Dept: UROLOGY | Facility: HOSPITAL | Age: 73
End: 2018-05-17
Attending: OBSTETRICS & GYNECOLOGY
Payer: MEDICARE

## 2018-05-17 VITALS
WEIGHT: 106 LBS | DIASTOLIC BLOOD PRESSURE: 70 MMHG | SYSTOLIC BLOOD PRESSURE: 120 MMHG | HEIGHT: 58 IN | BODY MASS INDEX: 22.25 KG/M2

## 2018-05-17 DIAGNOSIS — N81.2 UTEROVAGINAL PROLAPSE, INCOMPLETE: Primary | ICD-10-CM

## 2018-05-17 DIAGNOSIS — N95.2 POSTMENOPAUSAL ATROPHIC VAGINITIS: ICD-10-CM

## 2018-05-17 PROCEDURE — 99211 OFF/OP EST MAY X REQ PHY/QHP: CPT

## 2018-05-17 NOTE — PROCEDURES
.Patient here for pessary check, denies any c/o leakage, vaginal spotting/discharge. Patient happy with pessary.  Pt has not been for RN visit since Nov 2017, reviewed with pt the importance of coming every 2-3 mos for RN visits, pt reports it is difficult

## 2018-05-23 ENCOUNTER — TELEPHONE (OUTPATIENT)
Dept: UROLOGY | Facility: HOSPITAL | Age: 73
End: 2018-05-23

## 2018-08-27 ENCOUNTER — PATIENT MESSAGE (OUTPATIENT)
Dept: FAMILY MEDICINE CLINIC | Facility: CLINIC | Age: 73
End: 2018-08-27

## 2018-08-28 NOTE — TELEPHONE ENCOUNTER
Patient should start this 2 weeks before travel and then continue during travel and for 4 weeks after she is back, please send the pended prescription for 9 weeks and inform patients son.

## 2018-08-28 NOTE — TELEPHONE ENCOUNTER
From: Louisa Araiza  To: Adela Ball MD  Sent: 8/27/2018 7:57 PM CDT  Subject: Prescription Question    Hello ,    My mother Zoila Argueta is going to [facility] for a visit for 3 weeks starting 1st week of september.  She will go to [place] a

## 2018-08-29 RX ORDER — MEFLOQUINE HYDROCHLORIDE 250 MG/1
250 TABLET ORAL
Qty: 9 TABLET | Refills: 0 | Status: SHIPPED | OUTPATIENT
Start: 2018-08-29 | End: 2019-04-04

## 2018-08-30 RX ORDER — MEFLOQUINE HYDROCHLORIDE 250 MG/1
TABLET ORAL
Qty: 12 TABLET | Refills: 0 | OUTPATIENT
Start: 2018-08-30

## 2018-08-30 NOTE — TELEPHONE ENCOUNTER
LOV    LAST LAB    LAST RX    Next OV    PROTOCOL  Please advise. No protocol. Rx denied already done.

## 2018-11-15 ENCOUNTER — OFFICE VISIT (OUTPATIENT)
Dept: FAMILY MEDICINE CLINIC | Facility: CLINIC | Age: 73
End: 2018-11-15

## 2018-11-15 VITALS
SYSTOLIC BLOOD PRESSURE: 104 MMHG | WEIGHT: 107.25 LBS | RESPIRATION RATE: 14 BRPM | HEIGHT: 58.5 IN | TEMPERATURE: 98 F | OXYGEN SATURATION: 98 % | BODY MASS INDEX: 21.91 KG/M2 | HEART RATE: 72 BPM | DIASTOLIC BLOOD PRESSURE: 58 MMHG

## 2018-11-15 DIAGNOSIS — M62.830 SPASM OF BACK MUSCLES: ICD-10-CM

## 2018-11-15 DIAGNOSIS — N30.00 ACUTE CYSTITIS WITHOUT HEMATURIA: ICD-10-CM

## 2018-11-15 DIAGNOSIS — M75.01 ADHESIVE CAPSULITIS OF RIGHT SHOULDER: ICD-10-CM

## 2018-11-15 DIAGNOSIS — R10.30 LOWER ABDOMINAL PAIN: ICD-10-CM

## 2018-11-15 DIAGNOSIS — M54.50 ACUTE BILATERAL LOW BACK PAIN WITHOUT SCIATICA: Primary | ICD-10-CM

## 2018-11-15 PROCEDURE — 81003 URINALYSIS AUTO W/O SCOPE: CPT | Performed by: FAMILY MEDICINE

## 2018-11-15 PROCEDURE — 87086 URINE CULTURE/COLONY COUNT: CPT | Performed by: FAMILY MEDICINE

## 2018-11-15 PROCEDURE — 99214 OFFICE O/P EST MOD 30 MIN: CPT | Performed by: FAMILY MEDICINE

## 2018-11-15 RX ORDER — CIPROFLOXACIN 500 MG/1
500 TABLET, FILM COATED ORAL 2 TIMES DAILY
Qty: 14 TABLET | Refills: 0 | Status: SHIPPED | OUTPATIENT
Start: 2018-11-15 | End: 2018-11-22

## 2018-11-15 RX ORDER — CYCLOBENZAPRINE HCL 5 MG
5 TABLET ORAL NIGHTLY
Qty: 15 TABLET | Refills: 0 | Status: SHIPPED | OUTPATIENT
Start: 2018-11-15 | End: 2018-11-30

## 2018-11-15 RX ORDER — TRAMADOL HYDROCHLORIDE 50 MG/1
50 TABLET ORAL EVERY 8 HOURS PRN
Qty: 90 TABLET | Refills: 0 | Status: SHIPPED | OUTPATIENT
Start: 2018-11-15 | End: 2018-12-15

## 2018-11-15 NOTE — PROGRESS NOTES
Mayda Brown is a 75year old female. Patient presents with: Follow - Up: Brenton [facility] in [facility] here for follow up. Pain in lower back since she fell back in October. HPI:   Patient is seen today accompanied by her son.   Patient states fell in [facility] abo AmLODIPine Besylate 5 MG Oral Tab Take 1 tablet (5 mg total) by mouth once daily.  Disp: 90 tablet Rfl: 3   Levothyroxine Sodium 25 MCG Oral Tab Take 1 tablet (25 mcg total) by mouth before breakfast. Disp: 90 tablet Rfl: 2   Vitamin D3 (VITAMIN D3) 1000 of paraspinal muscles with tenderness to palpation, ROM limited due to pain. SLR negative bilateral.  NEURO: bilateral LE normal sensation, strength 5/5 and DTR 2+ and symmetrical. Gait is slow and guarded.     ASSESSMENT AND PLAN:   Sheila Scale was seen today

## 2018-11-20 ENCOUNTER — PATIENT MESSAGE (OUTPATIENT)
Dept: FAMILY MEDICINE CLINIC | Facility: CLINIC | Age: 73
End: 2018-11-20

## 2018-11-20 ENCOUNTER — TELEPHONE (OUTPATIENT)
Dept: FAMILY MEDICINE CLINIC | Facility: CLINIC | Age: 73
End: 2018-11-20

## 2018-11-20 DIAGNOSIS — M54.50 ACUTE BILATERAL LOW BACK PAIN WITHOUT SCIATICA: Primary | ICD-10-CM

## 2018-11-20 DIAGNOSIS — M62.830 SPASM OF BACK MUSCLES: ICD-10-CM

## 2018-11-20 DIAGNOSIS — M75.01 ADHESIVE CAPSULITIS OF RIGHT SHOULDER: ICD-10-CM

## 2018-11-20 NOTE — TELEPHONE ENCOUNTER
From: Quinten Rahman  To: Loulou Sun MD  Sent: 11/20/2018 12:19 PM CST  Subject: Visit Follow-up Question    Dr. Arsen Galindo, my mom is still complaining of stomach ache, and gets aggravated after she eats or drinks anything.  It's almost a five da

## 2018-11-26 ENCOUNTER — TELEPHONE (OUTPATIENT)
Dept: FAMILY MEDICINE CLINIC | Facility: CLINIC | Age: 73
End: 2018-11-26

## 2018-11-26 DIAGNOSIS — N39.8 DYSFUNCTIONAL VOIDING OF URINE: Primary | ICD-10-CM

## 2018-11-26 DIAGNOSIS — M62.830 SPASM OF BACK MUSCLES: ICD-10-CM

## 2018-11-26 NOTE — TELEPHONE ENCOUNTER
Left detailed message for patient/son on phone needs to be seen and referral information.      [facility] Fore, DO [address]  [facility]

## 2018-11-26 NOTE — TELEPHONE ENCOUNTER
Pain continues no UTI  per culture. Did take antibiotic x 5 days. Burning when she urinates. Abdominal pain when she eats. Afebrile No vomiting some nausea. Takes Pepcid Bid. Tums after she eats. But still has pain.  Son says she had HPylori test negativ

## 2018-11-26 NOTE — TELEPHONE ENCOUNTER
Please refer patient to urology, she saw Walt Retana before. Please have her follow up with me for the abdominal pain.

## 2018-11-27 ENCOUNTER — OFFICE VISIT (OUTPATIENT)
Dept: FAMILY MEDICINE CLINIC | Facility: CLINIC | Age: 73
End: 2018-11-27

## 2018-11-27 ENCOUNTER — TELEPHONE (OUTPATIENT)
Dept: UROLOGY | Facility: HOSPITAL | Age: 73
End: 2018-11-27

## 2018-11-27 ENCOUNTER — TELEPHONE (OUTPATIENT)
Dept: FAMILY MEDICINE CLINIC | Facility: CLINIC | Age: 73
End: 2018-11-27

## 2018-11-27 VITALS
OXYGEN SATURATION: 96 % | RESPIRATION RATE: 18 BRPM | HEART RATE: 76 BPM | SYSTOLIC BLOOD PRESSURE: 98 MMHG | TEMPERATURE: 98 F | BODY MASS INDEX: 22 KG/M2 | DIASTOLIC BLOOD PRESSURE: 56 MMHG | WEIGHT: 109.13 LBS

## 2018-11-27 DIAGNOSIS — R10.30 LOWER ABDOMINAL PAIN: Primary | ICD-10-CM

## 2018-11-27 PROCEDURE — 99213 OFFICE O/P EST LOW 20 MIN: CPT | Performed by: FAMILY MEDICINE

## 2018-11-27 NOTE — PATIENT INSTRUCTIONS
Will call with CT results when available, if negative will start an antispasmodic and if that does not help will refer to GI  For her urine symptoms please make an appointment with Susan Richter.

## 2018-11-27 NOTE — TELEPHONE ENCOUNTER
Pt son called stating Pt needs a referral for the Physical Therapy she started at [facility]. Son is bringing her in today to see Dr Rachel Michel.

## 2018-11-28 ENCOUNTER — TELEPHONE (OUTPATIENT)
Dept: FAMILY MEDICINE CLINIC | Facility: CLINIC | Age: 73
End: 2018-11-28

## 2018-11-28 RX ORDER — CYCLOBENZAPRINE HCL 5 MG
5 TABLET ORAL NIGHTLY
Qty: 15 TABLET | Refills: 0 | OUTPATIENT
Start: 2018-11-28 | End: 2018-12-13

## 2018-11-28 NOTE — TELEPHONE ENCOUNTER
LOV  11/27/18  abd pain    LAST RX 11/15/18  #15  only    Next OV   Future Appointments   Date Time Provider [facility]   11/29/2018 10:00 AM EDW AGUSTIN Lozano   1/8/2019 10:40 AM Eugene Murrieta DO [address]     PROTOCOL-NO

## 2018-11-28 NOTE — TELEPHONE ENCOUNTER
Cyclobenzaprine HCl 5 MG Oral Tab 15 tablet 0 11/15/2018 11/30/2018   Sig :  Take 1 tablet (5 mg total) by mouth nightly for 15 days. Called pharmacy says Rx was approved.

## 2018-11-28 NOTE — TELEPHONE ENCOUNTER
Son called to report that pt has been having low abdominal/pelvic pain, she went to see PCP, urine cx negative. PCP thought probable gyne or GI issue. Pt reports low abdominal/pelvic pain after eating, sometimes severe.    Pt had 1 pessary check in [address]

## 2018-11-29 ENCOUNTER — OFFICE VISIT (OUTPATIENT)
Dept: UROLOGY | Facility: HOSPITAL | Age: 73
End: 2018-11-29
Attending: OBSTETRICS & GYNECOLOGY
Payer: MEDICARE

## 2018-11-29 ENCOUNTER — APPOINTMENT (OUTPATIENT)
Dept: CT IMAGING | Facility: HOSPITAL | Age: 73
End: 2018-11-29
Attending: FAMILY MEDICINE
Payer: MEDICARE

## 2018-11-29 ENCOUNTER — HOSPITAL ENCOUNTER (OUTPATIENT)
Dept: CT IMAGING | Age: 73
Discharge: HOME | End: 2018-11-29
Attending: FAMILY MEDICINE
Payer: MEDICARE

## 2018-11-29 VITALS
WEIGHT: 109 LBS | SYSTOLIC BLOOD PRESSURE: 126 MMHG | HEIGHT: 58.5 IN | BODY MASS INDEX: 22.27 KG/M2 | DIASTOLIC BLOOD PRESSURE: 68 MMHG

## 2018-11-29 DIAGNOSIS — N89.8 VAGINAL DISCHARGE: ICD-10-CM

## 2018-11-29 DIAGNOSIS — N39.3 FEMALE STRESS INCONTINENCE: ICD-10-CM

## 2018-11-29 DIAGNOSIS — N81.2 UTEROVAGINAL PROLAPSE, INCOMPLETE: Primary | ICD-10-CM

## 2018-11-29 DIAGNOSIS — R10.30 LOWER ABDOMINAL PAIN: ICD-10-CM

## 2018-11-29 DIAGNOSIS — N95.2 POSTMENOPAUSAL ATROPHIC VAGINITIS: ICD-10-CM

## 2018-11-29 PROCEDURE — 87480 CANDIDA DNA DIR PROBE: CPT

## 2018-11-29 PROCEDURE — 87660 TRICHOMONAS VAGIN DIR PROBE: CPT

## 2018-11-29 PROCEDURE — 57150 TREAT VAGINA INFECTION: CPT

## 2018-11-29 PROCEDURE — 87510 GARDNER VAG DNA DIR PROBE: CPT

## 2018-11-29 PROCEDURE — 74176 CT ABD & PELVIS W/O CONTRAST: CPT | Performed by: FAMILY MEDICINE

## 2018-11-29 PROCEDURE — 99211 OFF/OP EST MAY X REQ PHY/QHP: CPT

## 2018-11-29 NOTE — PROCEDURES
.Patient here for pessary check, denies any c/o leakage, vaginal spotting/discharge. Patient happy with pessary. # 4 ring w/ support pessary removed by pt and cleaned. Yellow discharge noted. Pt son requesting vaginal culture e done today.   Speculum exam

## 2018-11-30 ENCOUNTER — TELEPHONE (OUTPATIENT)
Dept: UROLOGY | Facility: HOSPITAL | Age: 73
End: 2018-11-30

## 2018-11-30 ENCOUNTER — TELEPHONE (OUTPATIENT)
Dept: FAMILY MEDICINE CLINIC | Facility: CLINIC | Age: 73
End: 2018-11-30

## 2018-11-30 NOTE — TELEPHONE ENCOUNTER
Called [facility] with negative vaginal cultures.  Pt/son to call back if questions/concerns

## 2018-11-30 NOTE — TELEPHONE ENCOUNTER
Elissa Kent LPN          06/38/26 12:39 PM   Note      Pt has [employer] HMO, referral was given for [address]         ----- Message -----  From: Misael Akins  Sent: 11/27/2018  12:07 PM  To: [facility] Clinical Staff, *     REFERRAL CANCELLED PLEASE SEE

## 2018-12-06 ENCOUNTER — APPOINTMENT (OUTPATIENT)
Dept: PHYSICAL THERAPY | Age: 73
End: 2018-12-06
Attending: FAMILY MEDICINE

## 2018-12-10 ENCOUNTER — TELEPHONE (OUTPATIENT)
Dept: FAMILY MEDICINE CLINIC | Facility: CLINIC | Age: 73
End: 2018-12-10

## 2018-12-10 ENCOUNTER — PATIENT MESSAGE (OUTPATIENT)
Dept: FAMILY MEDICINE CLINIC | Facility: CLINIC | Age: 73
End: 2018-12-10

## 2018-12-10 ENCOUNTER — MED REC SCAN ONLY (OUTPATIENT)
Dept: FAMILY MEDICINE CLINIC | Facility: CLINIC | Age: 73
End: 2018-12-10

## 2018-12-10 DIAGNOSIS — K59.00 CONSTIPATION, UNSPECIFIED CONSTIPATION TYPE: Primary | ICD-10-CM

## 2018-12-10 DIAGNOSIS — R10.9 STOMACH PAIN: ICD-10-CM

## 2018-12-10 NOTE — TELEPHONE ENCOUNTER
From: Quinten Rahman  To:  Loulou Sun MD  Sent: 12/10/2018 8:12 AM CST  Subject: Visit Follow-up Question    Dr Arsen Galindo,    After more than a week of taking miralax daily, per your suggestion, my mom Quinten Rahman still complains of consta

## 2018-12-10 NOTE — TELEPHONE ENCOUNTER
Referred to Provider Information:  Provider Address Phone   Amanda Cooper [address] [place] [address] [phone]

## 2018-12-10 NOTE — TELEPHONE ENCOUNTER
Spoke with pt's son, advised that we placed and order for Rony Flores, gave info for Dr Dontrell Gallardo  Referred to Provider Information:  Provider Address Phone   Hung Guidry, [address] Ena Hewitt [id number]

## 2018-12-10 NOTE — PROGRESS NOTES
Nallely Cedeno is a 75year old female. Patient presents with:  Stomach Pain: Pt states pain has gotten worse. HPI:   Patient is seen for follow up accompanied by her son.  He states she has continued to complain of lower abdominal pain, right after tobacco: Never Used    Alcohol use: No    Drug use: No       REVIEW OF SYSTEMS:   GENERAL HEALTH: feels well otherwise  GI: as per HPI    EXAM:   BP 98/56   Pulse 76   Temp 98 °F (36.7 °C) (Temporal)   Resp 18   Wt 109 lb 2 oz   SpO2 96%   BMI 22.42 kg/m²

## 2018-12-10 NOTE — TELEPHONE ENCOUNTER
Son called and asked for [facility] Davidson[facility] Is there another GI Dr we can recommend? [facility] stated there is nothing noted that this is an urgent need for an Appt. Can we assist in getting an Appt?     Pls c

## 2018-12-12 ENCOUNTER — OFFICE VISIT (OUTPATIENT)
Dept: PHYSICAL THERAPY | Age: 73
End: 2018-12-12
Attending: FAMILY MEDICINE
Payer: MEDICARE

## 2018-12-12 PROCEDURE — 97161 PT EVAL LOW COMPLEX 20 MIN: CPT

## 2018-12-12 PROCEDURE — 97530 THERAPEUTIC ACTIVITIES: CPT

## 2018-12-12 NOTE — PROGRESS NOTES
SPINE EVALUATION:   Referring Physician: Dr. Phillips ref.  provider found  Diagnosis: bilateral LBP without sciatica Date of Service: 12/12/2018   *note son is present to partially translate for mother during session  PATIENT SUMMARY   Shellie Masters is a stand/walk, rest comfortably again . Past medical history was reviewed with pt.  Significant findings include osteopenia    Red Flag Qs: Pt currently denying the following: changes in bwl/bladder, saddle anesthesia  Pt currently denying pain or issues with 5/5  Knee Flexion: R 5/5; L 5/5   Knee extension: R 5/5; L 5/5   PF: R 5/5; L 5/5  DF: R 5/5; L 5/5     Flexibility:   LE   Hip Flexor: Rimpaired, L impaired  Hamstrings: R 80 deg ; L 80 deg   Piriformis: R WFL; L WFl     Special tests:   Repeated Motions limitation: None  Rehab Potential:good      Current G Code: Mobility: Walking and Moving Around CK: 40-59% impaired, limited, or restricted  Projected G Code:  Mobility: Walking and Moving Around CJ: 20-39% impaired, limited, or restricted      Patient/Fami

## 2018-12-13 ENCOUNTER — APPOINTMENT (OUTPATIENT)
Dept: PHYSICAL THERAPY | Age: 73
End: 2018-12-13
Attending: FAMILY MEDICINE

## 2018-12-19 ENCOUNTER — OFFICE VISIT (OUTPATIENT)
Dept: PHYSICAL THERAPY | Age: 73
End: 2018-12-19
Attending: FAMILY MEDICINE
Payer: MEDICARE

## 2018-12-19 PROCEDURE — 97140 MANUAL THERAPY 1/> REGIONS: CPT

## 2018-12-19 PROCEDURE — 97110 THERAPEUTIC EXERCISES: CPT

## 2018-12-19 PROCEDURE — 97112 NEUROMUSCULAR REEDUCATION: CPT

## 2018-12-19 NOTE — PROGRESS NOTES
Dx: bilateral LBP without sciatica         Authorized # of Visits:  16 visits auth through 12/31/18         Next MD visit: 12/21/18  Fall Risk: standard         Precautions: none             Subjective: Pt reports she is feeling \"much better. \" She has on handouts provided) and pt education: LTR, knee to chest  12/19/2018 hip abduction green band, clam shell, LTR with encouraged end range motion  Charges:  There ex: 1; manual: 1; neuro hailey: 1       Total Timed Treatment: 40 min  Total Treatment Time: 45 min

## 2018-12-20 ENCOUNTER — APPOINTMENT (OUTPATIENT)
Dept: PHYSICAL THERAPY | Age: 73
End: 2018-12-20
Attending: FAMILY MEDICINE

## 2019-01-03 ENCOUNTER — APPOINTMENT (OUTPATIENT)
Dept: PHYSICAL THERAPY | Age: 74
End: 2019-01-03
Attending: FAMILY MEDICINE

## 2019-01-08 ENCOUNTER — MED REC SCAN ONLY (OUTPATIENT)
Dept: FAMILY MEDICINE CLINIC | Facility: CLINIC | Age: 74
End: 2019-01-08

## 2019-01-09 PROCEDURE — 88305 TISSUE EXAM BY PATHOLOGIST: CPT | Performed by: SPECIALIST

## 2019-01-10 ENCOUNTER — OFFICE VISIT (OUTPATIENT)
Dept: PHYSICAL THERAPY | Age: 74
End: 2019-01-10
Attending: FAMILY MEDICINE
Payer: MEDICARE

## 2019-01-10 PROCEDURE — 97161 PT EVAL LOW COMPLEX 20 MIN: CPT

## 2019-01-10 PROCEDURE — 97110 THERAPEUTIC EXERCISES: CPT

## 2019-01-10 NOTE — PROGRESS NOTES
Dx: bilateral LBP without sciatica         Authorized # of Visits:  16 visits auth through 12/31/18         Next MD visit: TBA  Fall Risk: standard         Precautions: none           SHOULDER EVALUATION:   Pt has attended 3 visits in Physical Therapy   Lucio tightness in posterior cuff. Tightness resolved immediately with some AAROM overhead. Pt had some muscular irritation in R shoulder which was evident only in resisted flexion/abduction.  She had good strength and tolerates good resistance through rotator cu Wall slides 10 reps flexion; 10 reps abduction         STM to quadratus R/L 5 min each  Wall circles 20 CW 20 CCW        Reverse marches 10 reps R/L x 2 sets Green band row 20 reps        - AROM scaption 1# 10 reps; no weight 10 reps        Pt education: H

## 2019-01-17 ENCOUNTER — APPOINTMENT (OUTPATIENT)
Dept: PHYSICAL THERAPY | Age: 74
End: 2019-01-17
Attending: FAMILY MEDICINE
Payer: MEDICARE

## 2019-02-17 DIAGNOSIS — E03.9 HYPOTHYROIDISM, UNSPECIFIED TYPE: ICD-10-CM

## 2019-02-18 DIAGNOSIS — M85.80 OSTEOPENIA, UNSPECIFIED LOCATION: ICD-10-CM

## 2019-02-19 NOTE — TELEPHONE ENCOUNTER
LOV 11/18    LAST LAB 1/18 Needs lab.      LAST RX   Levothyroxine Sodium 50 MCG Oral Tab 90 tablet 2 2/1/2018     Levothyroxine Sodium 25 MCG Oral Tab 90 tablet 2 2/1/2018         Next OV Visit date not found      PROTOCOL    Thyroid Supplements Protocol

## 2019-02-20 RX ORDER — ALENDRONATE SODIUM 70 MG/1
TABLET ORAL
Qty: 4 TABLET | Refills: 0 | OUTPATIENT
Start: 2019-02-20

## 2019-02-20 RX ORDER — LEVOTHYROXINE SODIUM 0.05 MG/1
50 TABLET ORAL EVERY OTHER DAY
Qty: 15 TABLET | Refills: 0 | Status: SHIPPED | OUTPATIENT
Start: 2019-02-20 | End: 2019-03-01

## 2019-02-20 RX ORDER — LEVOTHYROXINE SODIUM 0.03 MG/1
25 TABLET ORAL EVERY OTHER DAY
Qty: 15 TABLET | Refills: 0 | Status: SHIPPED | OUTPATIENT
Start: 2019-02-20 | End: 2019-03-01

## 2019-02-20 NOTE — TELEPHONE ENCOUNTER
Please have patient recheck her labs, patient is due for follow-up and also is due for her MAPS visit, patient alternates and needs to be on the different doses, please send prescription for 30 days.

## 2019-02-20 NOTE — TELEPHONE ENCOUNTER
Me          3:45 PM   Note      Left detailed message for patient on phone. . Needs to be seen for MAPS ov.            Osteoporosis Medication Protocol Failed2/20 3:55 PM   DEXA scan within past 2 years    CMP within the past 12 months    Calcium level betw

## 2019-02-21 NOTE — TELEPHONE ENCOUNTER
Son called to schedule a medication appointment. Once scheduled, he states he was told it should be a MAPS. Changed visit type to MAPS.   He also states he was told he could get a 15 day supply of all of her medications to get her through until her appoin

## 2019-02-28 ENCOUNTER — LAB ENCOUNTER (OUTPATIENT)
Dept: LAB | Age: 74
End: 2019-02-28
Attending: FAMILY MEDICINE
Payer: MEDICARE

## 2019-02-28 ENCOUNTER — OFFICE VISIT (OUTPATIENT)
Dept: FAMILY MEDICINE CLINIC | Facility: CLINIC | Age: 74
End: 2019-02-28
Payer: MEDICARE

## 2019-02-28 VITALS
WEIGHT: 108 LBS | DIASTOLIC BLOOD PRESSURE: 70 MMHG | SYSTOLIC BLOOD PRESSURE: 124 MMHG | TEMPERATURE: 98 F | BODY MASS INDEX: 22.67 KG/M2 | RESPIRATION RATE: 14 BRPM | OXYGEN SATURATION: 98 % | HEART RATE: 78 BPM | HEIGHT: 58 IN

## 2019-02-28 DIAGNOSIS — G89.29 CHRONIC MIDLINE LOW BACK PAIN WITHOUT SCIATICA: ICD-10-CM

## 2019-02-28 DIAGNOSIS — E03.9 HYPOTHYROIDISM, UNSPECIFIED TYPE: ICD-10-CM

## 2019-02-28 DIAGNOSIS — Z78.0 POSTMENOPAUSAL: ICD-10-CM

## 2019-02-28 DIAGNOSIS — I10 ESSENTIAL HYPERTENSION, BENIGN: ICD-10-CM

## 2019-02-28 DIAGNOSIS — M85.80 OSTEOPENIA, UNSPECIFIED LOCATION: ICD-10-CM

## 2019-02-28 DIAGNOSIS — K21.9 GASTROESOPHAGEAL REFLUX DISEASE WITHOUT ESOPHAGITIS: ICD-10-CM

## 2019-02-28 DIAGNOSIS — H91.93 HEARING DIFFICULTY OF BOTH EARS: ICD-10-CM

## 2019-02-28 DIAGNOSIS — Z12.31 VISIT FOR SCREENING MAMMOGRAM: ICD-10-CM

## 2019-02-28 DIAGNOSIS — M54.50 CHRONIC MIDLINE LOW BACK PAIN WITHOUT SCIATICA: ICD-10-CM

## 2019-02-28 DIAGNOSIS — Z00.00 ENCOUNTER FOR ANNUAL HEALTH EXAMINATION: Primary | ICD-10-CM

## 2019-02-28 DIAGNOSIS — D50.8 IRON DEFICIENCY ANEMIA SECONDARY TO INADEQUATE DIETARY IRON INTAKE: ICD-10-CM

## 2019-02-28 DIAGNOSIS — N81.10 FEMALE BLADDER PROLAPSE: ICD-10-CM

## 2019-02-28 LAB
ALBUMIN SERPL-MCNC: 3.7 G/DL (ref 3.4–5)
ALBUMIN/GLOB SERPL: 1 {RATIO} (ref 1–2)
ALP LIVER SERPL-CCNC: 62 U/L (ref 55–142)
ALT SERPL-CCNC: 22 U/L (ref 13–56)
ANION GAP SERPL CALC-SCNC: 5 MMOL/L (ref 0–18)
AST SERPL-CCNC: 26 U/L (ref 15–37)
BASOPHILS # BLD AUTO: 0.09 X10(3) UL (ref 0–0.2)
BASOPHILS NFR BLD AUTO: 1.4 %
BILIRUB SERPL-MCNC: 0.3 MG/DL (ref 0.1–2)
BUN BLD-MCNC: 6 MG/DL (ref 7–18)
BUN/CREAT SERPL: 9.7 (ref 10–20)
CALCIUM BLD-MCNC: 8.9 MG/DL (ref 8.5–10.1)
CHLORIDE SERPL-SCNC: 99 MMOL/L (ref 98–107)
CO2 SERPL-SCNC: 28 MMOL/L (ref 21–32)
CREAT BLD-MCNC: 0.62 MG/DL (ref 0.55–1.02)
DEPRECATED RDW RBC AUTO: 40 FL (ref 35.1–46.3)
EOSINOPHIL # BLD AUTO: 0.07 X10(3) UL (ref 0–0.7)
EOSINOPHIL NFR BLD AUTO: 1.1 %
ERYTHROCYTE [DISTWIDTH] IN BLOOD BY AUTOMATED COUNT: 13.8 % (ref 11–15)
GLOBULIN PLAS-MCNC: 3.7 G/DL (ref 2.8–4.4)
GLUCOSE BLD-MCNC: 92 MG/DL (ref 70–99)
HCT VFR BLD AUTO: 36.4 % (ref 35–48)
HGB BLD-MCNC: 11.9 G/DL (ref 12–16)
IMM GRANULOCYTES # BLD AUTO: 0.01 X10(3) UL (ref 0–1)
IMM GRANULOCYTES NFR BLD: 0.2 %
LYMPHOCYTES # BLD AUTO: 1.88 X10(3) UL (ref 1–4)
LYMPHOCYTES NFR BLD AUTO: 28.4 %
M PROTEIN MFR SERPL ELPH: 7.4 G/DL (ref 6.4–8.2)
MCH RBC QN AUTO: 26.1 PG (ref 26–34)
MCHC RBC AUTO-ENTMCNC: 32.7 G/DL (ref 31–37)
MCV RBC AUTO: 79.8 FL (ref 80–100)
MONOCYTES # BLD AUTO: 0.59 X10(3) UL (ref 0.1–1)
MONOCYTES NFR BLD AUTO: 8.9 %
NEUTROPHILS # BLD AUTO: 3.97 X10 (3) UL (ref 1.5–7.7)
NEUTROPHILS # BLD AUTO: 3.97 X10(3) UL (ref 1.5–7.7)
NEUTROPHILS NFR BLD AUTO: 60 %
OSMOLALITY SERPL CALC.SUM OF ELEC: 271 MOSM/KG (ref 275–295)
PLATELET # BLD AUTO: 273 10(3)UL (ref 150–450)
POTASSIUM SERPL-SCNC: 4.3 MMOL/L (ref 3.5–5.1)
RBC # BLD AUTO: 4.56 X10(6)UL (ref 3.8–5.3)
SODIUM SERPL-SCNC: 132 MMOL/L (ref 136–145)
T4 FREE SERPL-MCNC: 0.9 NG/DL (ref 0.8–1.7)
TSI SER-ACNC: 3.08 MIU/ML (ref 0.36–3.74)
WBC # BLD AUTO: 6.6 X10(3) UL (ref 4–11)

## 2019-02-28 PROCEDURE — G0439 PPPS, SUBSEQ VISIT: HCPCS | Performed by: FAMILY MEDICINE

## 2019-02-28 PROCEDURE — 80053 COMPREHEN METABOLIC PANEL: CPT

## 2019-02-28 PROCEDURE — 84439 ASSAY OF FREE THYROXINE: CPT

## 2019-02-28 PROCEDURE — 84443 ASSAY THYROID STIM HORMONE: CPT

## 2019-02-28 PROCEDURE — 85025 COMPLETE CBC W/AUTO DIFF WBC: CPT

## 2019-02-28 PROCEDURE — 96160 PT-FOCUSED HLTH RISK ASSMT: CPT | Performed by: FAMILY MEDICINE

## 2019-02-28 RX ORDER — ESTRADIOL 0.1 MG/G
CREAM VAGINAL
Qty: 42.5 G | Refills: 3 | Status: CANCELLED | OUTPATIENT
Start: 2019-02-28

## 2019-02-28 RX ORDER — ALENDRONATE SODIUM 70 MG/1
TABLET ORAL
Qty: 12 TABLET | Refills: 3 | Status: SHIPPED | OUTPATIENT
Start: 2019-02-28 | End: 2020-03-05

## 2019-02-28 RX ORDER — LEVOTHYROXINE SODIUM 0.05 MG/1
50 TABLET ORAL EVERY OTHER DAY
Qty: 15 TABLET | Refills: 0 | Status: CANCELLED | OUTPATIENT
Start: 2019-02-28

## 2019-02-28 RX ORDER — FAMOTIDINE 20 MG/1
20 TABLET ORAL 2 TIMES DAILY
Refills: 0 | Status: CANCELLED | OUTPATIENT
Start: 2019-02-28

## 2019-02-28 RX ORDER — LEVOTHYROXINE SODIUM 0.03 MG/1
25 TABLET ORAL EVERY OTHER DAY
Qty: 15 TABLET | Refills: 0 | Status: CANCELLED | OUTPATIENT
Start: 2019-02-28

## 2019-02-28 RX ORDER — AMLODIPINE BESYLATE 5 MG/1
5 TABLET ORAL
Qty: 90 TABLET | Refills: 3 | Status: SHIPPED | OUTPATIENT
Start: 2019-02-28 | End: 2020-03-05

## 2019-02-28 NOTE — PROGRESS NOTES
Patient presents with: Other: Maps  Medication Follow-Up: Refill medications    HPI:   Jean-Pierre Clemens is a 75year old female who presents for a MA (Medicare Advantage) Supervisit (Once per calendar year).     Her last annual assessment has been over 1 functional status. Shop for groceries: Need some help         She has Hearing problems based on screening of functional status.    Hearing Problems?: Yes                  Depression Screening (PHQ-2/PHQ-9): Over the LAST 2 WEEKS   Little interest or pleas 11/30/2017    HGB 11.4 (L) 11/30/2017    .0 11/30/2017        ALLERGIES:   She is allergic to penicillins.     CURRENT MEDICATIONS:     Outpatient Medications Marked as Taking for the 2/28/19 encounter (Office Visit) with Marilia So MD:  Kush Rose otherwise  SKIN: denies any unusual skin lesions  EYES: denies blurred vision or double vision  HEENT: denies nasal congestion, sinus pain or ST  LUNGS: denies shortness of breath with exertion  CARDIOVASCULAR: denies chest pain on exertion  GI: denies abd speech normal, DTR's equal bilaterally.     Vaccination History     Immunization History   Administered Date(s) Administered   • FLU VACC High Dose 65 YRS & Older PRSV Free (86568) 11/06/2014   • Fluvirin, 3 Years & >, Im 11/04/2003, 12/05/2012   • Fluzone healthy diet, lifestyle, and exercise. Return in 6 months (on 8/28/2019).      Zenaida Mac MD, 2/28/2019     General Health     In the past six months, have you lost more than 10 pounds without trying?: 2 - No  Has your appetite been poor?: No  How yrs age 33-67, age 72 and older at high risk There are no preventive care reminders to display for this patient. Update Health Maintenance if applicable    Chlamydia  Annually if high risk No results found for: CHLAMYDIA No flowsheet data found.     Louie Soria

## 2019-02-28 NOTE — PATIENT INSTRUCTIONS
[facility] SCREENING SCHEDULE   Tests on this list are recommended by your physician but may not be covered, or covered at this frequency, by your insurer. Please check with your insurance carrier before scheduling to verify coverage.    PREVENT Colorectal Cancer Screening  Covered up to Age 76     Colonoscopy Screen   Covered every 10 years- more often if abnormal There are no preventive care reminders to display for this patient.  Update Health Maintenance if applicable    Flex Sigmoidoscopy Sc visit on 11/06/14   • FLU VACC PRSV FREE INC ANTIG   • ADMIN INFLUENZA VIRUS VAC    Please get every year    Pneumococcal 13 (Prevnar)  Covered Once after 65 Orders placed or performed in visit on 10/15/15   • PNEUMOCOCCAL VACC, 13 SREE IM    Please get onc Association regarding Advance Directives. It[facility]un[facility] SCREENING SCHEDULE   Tests on this list are recommended by your physician but may not be covered, or covered at this frequency, by your insurer.  Please check with your insurance carrier before lifetime   • Anyone with a family history    Colorectal Cancer Screening  Covered up to Age 76     Colonoscopy Screen   Covered every 10 years- more often if abnormal There are no preventive care reminders to display for this patient.  Update [employer] ANTIG   Orders placed or performed in visit on 11/06/14   • FLU VACC PRSV FREE INC ANTIG   • ADMIN INFLUENZA VIRUS VAC    Please get every year    Pneumococcal 13 (Prevnar)  Covered Once after 65 Orders placed or performed in visit on 10/15/15   • PNEU information from the [address] [place] [address] regarding Advance Directives.

## 2019-04-04 ENCOUNTER — TELEPHONE (OUTPATIENT)
Dept: FAMILY MEDICINE CLINIC | Facility: CLINIC | Age: 74
End: 2019-04-04

## 2019-04-04 RX ORDER — MEFLOQUINE HYDROCHLORIDE 250 MG/1
250 TABLET ORAL
Qty: 7 TABLET | Refills: 0 | Status: SHIPPED | OUTPATIENT
Start: 2019-04-04 | End: 2019-05-17

## 2019-04-04 NOTE — TELEPHONE ENCOUNTER
Per Dr Aquino Hopping ok for Rx but son needs to be informed that it may not be as effective since she did not start 2 weeks in advance.     Son advised and understands they should have notified sooner    Pt will be gone approx 3 weeks and needs 4 additional week

## 2019-05-02 ENCOUNTER — HOSPITAL ENCOUNTER (OUTPATIENT)
Dept: BONE DENSITY | Age: 74
Discharge: HOME | End: 2019-05-02
Attending: FAMILY MEDICINE
Payer: MEDICARE

## 2019-05-02 ENCOUNTER — HOSPITAL ENCOUNTER (OUTPATIENT)
Dept: MAMMOGRAPHY | Age: 74
Discharge: HOME | End: 2019-05-02
Attending: FAMILY MEDICINE
Payer: MEDICARE

## 2019-05-02 DIAGNOSIS — Z78.0 POSTMENOPAUSAL: ICD-10-CM

## 2019-05-02 DIAGNOSIS — Z12.31 VISIT FOR SCREENING MAMMOGRAM: ICD-10-CM

## 2019-05-02 PROCEDURE — 77080 DXA BONE DENSITY AXIAL: CPT | Performed by: FAMILY MEDICINE

## 2019-05-02 PROCEDURE — 77063 BREAST TOMOSYNTHESIS BI: CPT | Performed by: FAMILY MEDICINE

## 2019-05-02 PROCEDURE — 77067 SCR MAMMO BI INCL CAD: CPT | Performed by: FAMILY MEDICINE

## 2019-06-03 DIAGNOSIS — E03.9 HYPOTHYROIDISM, UNSPECIFIED TYPE: ICD-10-CM

## 2019-06-05 RX ORDER — LEVOTHYROXINE SODIUM 0.05 MG/1
TABLET ORAL
Qty: 45 TABLET | Refills: 1 | Status: SHIPPED | OUTPATIENT
Start: 2019-06-05 | End: 2020-03-10

## 2019-06-05 RX ORDER — LEVOTHYROXINE SODIUM 0.03 MG/1
TABLET ORAL
Qty: 45 TABLET | Refills: 1 | Status: SHIPPED | OUTPATIENT
Start: 2019-06-05 | End: 2020-03-10

## 2019-06-05 NOTE — TELEPHONE ENCOUNTER
LOV 2/28/19    LAST LAB 2/28/19    LAST RX 3/1/19 x 4 months    Next OV No future appointments.       PROTOCOL    Thyroid Supplements Protocol Passed6/3 8:01 PM   TSH test in past 12 months    TSH value between 0.350 and 5.500 IU/ml    Appointment in past 1

## 2019-09-20 ENCOUNTER — PATIENT OUTREACH (OUTPATIENT)
Dept: CASE MANAGEMENT | Age: 74
End: 2019-09-20

## 2019-10-17 ENCOUNTER — OFFICE VISIT (OUTPATIENT)
Dept: FAMILY MEDICINE CLINIC | Facility: CLINIC | Age: 74
End: 2019-10-17
Payer: MEDICARE

## 2019-10-17 VITALS
WEIGHT: 109 LBS | HEART RATE: 67 BPM | TEMPERATURE: 97 F | OXYGEN SATURATION: 99 % | HEIGHT: 58 IN | DIASTOLIC BLOOD PRESSURE: 82 MMHG | SYSTOLIC BLOOD PRESSURE: 110 MMHG | RESPIRATION RATE: 18 BRPM | BODY MASS INDEX: 22.88 KG/M2

## 2019-10-17 DIAGNOSIS — Z23 NEED FOR VACCINATION: ICD-10-CM

## 2019-10-17 DIAGNOSIS — H91.91 HEARING LOSS OF RIGHT EAR, UNSPECIFIED HEARING LOSS TYPE: Primary | ICD-10-CM

## 2019-10-17 DIAGNOSIS — H43.393 VITREOUS FLOATERS OF BOTH EYES: ICD-10-CM

## 2019-10-17 PROCEDURE — 90662 IIV NO PRSV INCREASED AG IM: CPT | Performed by: FAMILY MEDICINE

## 2019-10-17 PROCEDURE — G0008 ADMIN INFLUENZA VIRUS VAC: HCPCS | Performed by: FAMILY MEDICINE

## 2019-10-17 PROCEDURE — 99213 OFFICE O/P EST LOW 20 MIN: CPT | Performed by: FAMILY MEDICINE

## 2019-10-24 ENCOUNTER — TELEPHONE (OUTPATIENT)
Dept: FAMILY MEDICINE CLINIC | Facility: CLINIC | Age: 74
End: 2019-10-24

## 2019-10-24 DIAGNOSIS — H43.393 VITREOUS FLOATERS OF BOTH EYES: Primary | ICD-10-CM

## 2019-10-25 NOTE — TELEPHONE ENCOUNTER
Patients son called and said that the dr his mom was bishnu to did not specialize in floaters so they referred her to  in [facility] he does not know the first name , but they told him they need to [address] changed .     Also the son would like to know w
Updated referral order placed for Dr. Jeremias Loyd as requested by patient's son.
31.6

## 2019-10-28 ENCOUNTER — PATIENT MESSAGE (OUTPATIENT)
Dept: FAMILY MEDICINE CLINIC | Facility: CLINIC | Age: 74
End: 2019-10-28

## 2019-10-28 NOTE — TELEPHONE ENCOUNTER
From: Briana Durand  To: Dulce Maria Cannon MD  Sent: 10/28/2019 4:41 AM CDT  Subject: Other    Re Briana Rojo, you had made a referral for my mom to Dr Nadege Damico, re vitreous floaters of both eyes.  When I called their office to f

## 2019-10-28 NOTE — PROGRESS NOTES
Ulisses Escobar is a 75year old female. Patient presents with:  Vision Problem: floaters  Hearing Problem: right hear    HPI:   Patient is seen today accompanied by her son.   Complaining of floaters, squiggly lines when she closes an opens her eyes, s bilateral external ear canals and TM's are normal  LUNGS: clear to auscultation  CARDIO: RRR without murmur    ASSESSMENT AND PLAN:   Ronda Short was seen today for vision problem and hearing problem.     Diagnoses and all orders for this visit:    Hearing loss

## 2019-11-26 ENCOUNTER — TELEPHONE (OUTPATIENT)
Dept: UROLOGY | Facility: HOSPITAL | Age: 74
End: 2019-11-26

## 2019-11-26 NOTE — TELEPHONE ENCOUNTER
Pt's son called for estrace refill.   After checking patients chart informed the son that patient has not been seen by Dr. Yary Jiang in 3 years, patient cancelled appointment for January of 2019 with Dr. Yary Jiang and last nurse visit for pessary check was

## 2019-12-05 ENCOUNTER — TELEPHONE (OUTPATIENT)
Dept: FAMILY MEDICINE CLINIC | Facility: CLINIC | Age: 74
End: 2019-12-05

## 2019-12-05 DIAGNOSIS — Z46.89 PESSARY MAINTENANCE: ICD-10-CM

## 2019-12-05 DIAGNOSIS — N39.8 DYSFUNCTIONAL VOIDING OF URINE: ICD-10-CM

## 2019-12-05 DIAGNOSIS — N39.3 FEMALE STRESS INCONTINENCE: ICD-10-CM

## 2019-12-05 DIAGNOSIS — N81.10 FEMALE BLADDER PROLAPSE: Primary | ICD-10-CM

## 2019-12-05 NOTE — TELEPHONE ENCOUNTER
Reason for the order/referral: March Roland / REINSERTION / FOLLOW UP   PCP: [email or web address] Mary Ortiz   Refer to Provider (first and last name): [facility] / [id number]   Specialty: OB/GYN   Patient Insurance: Payor

## 2019-12-06 NOTE — TELEPHONE ENCOUNTER
Please advise patient's son to look through his mothers network for a urogyne and let us know, we can place the referral. I only know of 's group.

## 2019-12-19 ENCOUNTER — TELEPHONE (OUTPATIENT)
Dept: UROLOGY | Facility: HOSPITAL | Age: 74
End: 2019-12-19

## 2019-12-19 NOTE — TELEPHONE ENCOUNTER
Son called and [facility] last evening asking if pt is scheduled to see Dr. Beatriz Sellers today. Called son back, verified that pt appt today is scheduled with RN.   We scheduled a nurse visit for today and a Dr. Beatriz Sellers appt in February as pt has not been seen for pessary check

## 2020-01-09 ENCOUNTER — TELEPHONE (OUTPATIENT)
Dept: FAMILY MEDICINE CLINIC | Facility: CLINIC | Age: 75
End: 2020-01-09

## 2020-01-09 NOTE — TELEPHONE ENCOUNTER
She is due for annual well visit in February. Could you please call to help her schedule? If she is having trouble now with something please let us know. Return in 1 year (on 2/28/2020).

## 2020-01-09 NOTE — TELEPHONE ENCOUNTER
Patient wants to know when patient is due to come back in for appointment does not say in last appointment

## 2020-01-30 ENCOUNTER — OFFICE VISIT (OUTPATIENT)
Dept: FAMILY MEDICINE CLINIC | Facility: CLINIC | Age: 75
End: 2020-01-30
Payer: MEDICARE

## 2020-01-30 VITALS
TEMPERATURE: 97 F | HEIGHT: 58 IN | BODY MASS INDEX: 22.67 KG/M2 | RESPIRATION RATE: 18 BRPM | WEIGHT: 108 LBS | OXYGEN SATURATION: 98 % | HEART RATE: 65 BPM | SYSTOLIC BLOOD PRESSURE: 120 MMHG | DIASTOLIC BLOOD PRESSURE: 76 MMHG

## 2020-01-30 DIAGNOSIS — N30.01 ACUTE CYSTITIS WITH HEMATURIA: Primary | ICD-10-CM

## 2020-01-30 DIAGNOSIS — N81.10 FEMALE BLADDER PROLAPSE: ICD-10-CM

## 2020-01-30 DIAGNOSIS — N95.2 POSTMENOPAUSAL ATROPHIC VAGINITIS: ICD-10-CM

## 2020-01-30 LAB
MULTISTIX LOT#: ABNORMAL NUMERIC
PH, URINE: 8.5 (ref 4.5–8)
SPECIFIC GRAVITY: 1.01 (ref 1–1.03)
URINE-COLOR: YELLOW
UROBILINOGEN,SEMI-QN: 0.2 MG/DL (ref 0–1.9)

## 2020-01-30 PROCEDURE — 81003 URINALYSIS AUTO W/O SCOPE: CPT | Performed by: FAMILY MEDICINE

## 2020-01-30 PROCEDURE — 87086 URINE CULTURE/COLONY COUNT: CPT | Performed by: FAMILY MEDICINE

## 2020-01-30 PROCEDURE — 99213 OFFICE O/P EST LOW 20 MIN: CPT | Performed by: FAMILY MEDICINE

## 2020-01-30 RX ORDER — ESTRADIOL 0.1 MG/G
CREAM VAGINAL
Qty: 42.5 G | Refills: 0 | Status: SHIPPED | OUTPATIENT
Start: 2020-01-30 | End: 2020-05-26

## 2020-01-30 NOTE — PATIENT INSTRUCTIONS
Plea continue bactrim for now to finish a 7 day course. Will call with culture results when available.

## 2020-01-30 NOTE — PROGRESS NOTES
Annamarie Richey is a 75year old female. Patient presents with:  Urinary Symptoms    HPI:   Patient is seen accompanied by her son complaining of burning with urination and blood in urine that she noticed 4 days ago.   States her son who is a pharmacist HEALTH: feels well otherwise  RESPIRATORY: denies shortness of breath with exertion  CARDIOVASCULAR: denies chest pain on exertion  : as per HPI    EXAM:   /76   Pulse 65   Temp 97.2 °F (36.2 °C) (Temporal)   Resp 18   Ht 58\"   Wt 108 lb (49 kg)

## 2020-03-05 ENCOUNTER — OFFICE VISIT (OUTPATIENT)
Dept: FAMILY MEDICINE CLINIC | Facility: CLINIC | Age: 75
End: 2020-03-05
Payer: MEDICARE

## 2020-03-05 ENCOUNTER — LAB ENCOUNTER (OUTPATIENT)
Dept: LAB | Age: 75
End: 2020-03-05
Attending: FAMILY MEDICINE
Payer: MEDICARE

## 2020-03-05 VITALS
BODY MASS INDEX: 23.09 KG/M2 | RESPIRATION RATE: 18 BRPM | OXYGEN SATURATION: 98 % | HEART RATE: 76 BPM | DIASTOLIC BLOOD PRESSURE: 82 MMHG | WEIGHT: 110 LBS | HEIGHT: 58 IN | TEMPERATURE: 97 F | SYSTOLIC BLOOD PRESSURE: 110 MMHG

## 2020-03-05 DIAGNOSIS — G89.29 CHRONIC MIDLINE LOW BACK PAIN WITHOUT SCIATICA: ICD-10-CM

## 2020-03-05 DIAGNOSIS — Z00.00 ENCOUNTER FOR ANNUAL HEALTH EXAMINATION: Primary | ICD-10-CM

## 2020-03-05 DIAGNOSIS — Z13.6 SCREENING FOR CARDIOVASCULAR CONDITION: ICD-10-CM

## 2020-03-05 DIAGNOSIS — M85.80 OSTEOPENIA, UNSPECIFIED LOCATION: ICD-10-CM

## 2020-03-05 DIAGNOSIS — D50.8 IRON DEFICIENCY ANEMIA SECONDARY TO INADEQUATE DIETARY IRON INTAKE: ICD-10-CM

## 2020-03-05 DIAGNOSIS — I10 ESSENTIAL HYPERTENSION, BENIGN: ICD-10-CM

## 2020-03-05 DIAGNOSIS — H91.93 HEARING DIFFICULTY OF BOTH EARS: ICD-10-CM

## 2020-03-05 DIAGNOSIS — M54.50 CHRONIC MIDLINE LOW BACK PAIN WITHOUT SCIATICA: ICD-10-CM

## 2020-03-05 DIAGNOSIS — E03.9 HYPOTHYROIDISM, UNSPECIFIED TYPE: ICD-10-CM

## 2020-03-05 DIAGNOSIS — N81.10 FEMALE BLADDER PROLAPSE: ICD-10-CM

## 2020-03-05 LAB
ALBUMIN SERPL-MCNC: 3.9 G/DL (ref 3.4–5)
ALBUMIN/GLOB SERPL: 1 {RATIO} (ref 1–2)
ALP LIVER SERPL-CCNC: 60 U/L (ref 55–142)
ALT SERPL-CCNC: 23 U/L (ref 13–56)
ANION GAP SERPL CALC-SCNC: 3 MMOL/L (ref 0–18)
AST SERPL-CCNC: 26 U/L (ref 15–37)
BASOPHILS # BLD AUTO: 0.09 X10(3) UL (ref 0–0.2)
BASOPHILS NFR BLD AUTO: 1.5 %
BILIRUB SERPL-MCNC: 0.4 MG/DL (ref 0.1–2)
BUN BLD-MCNC: 8 MG/DL (ref 7–18)
BUN/CREAT SERPL: 12.5 (ref 10–20)
CALCIUM BLD-MCNC: 9.2 MG/DL (ref 8.5–10.1)
CHLORIDE SERPL-SCNC: 98 MMOL/L (ref 98–112)
CHOLEST SMN-MCNC: 199 MG/DL (ref ?–200)
CO2 SERPL-SCNC: 28 MMOL/L (ref 21–32)
CREAT BLD-MCNC: 0.64 MG/DL (ref 0.55–1.02)
DEPRECATED RDW RBC AUTO: 40.4 FL (ref 35.1–46.3)
EOSINOPHIL # BLD AUTO: 0.08 X10(3) UL (ref 0–0.7)
EOSINOPHIL NFR BLD AUTO: 1.4 %
ERYTHROCYTE [DISTWIDTH] IN BLOOD BY AUTOMATED COUNT: 13.9 % (ref 11–15)
GLOBULIN PLAS-MCNC: 4 G/DL (ref 2.8–4.4)
GLUCOSE BLD-MCNC: 82 MG/DL (ref 70–99)
HCT VFR BLD AUTO: 38.2 % (ref 35–48)
HDLC SERPL-MCNC: 66 MG/DL (ref 40–59)
HGB BLD-MCNC: 12.1 G/DL (ref 12–16)
IMM GRANULOCYTES # BLD AUTO: 0.01 X10(3) UL (ref 0–1)
IMM GRANULOCYTES NFR BLD: 0.2 %
LDLC SERPL CALC-MCNC: 113 MG/DL (ref ?–100)
LYMPHOCYTES # BLD AUTO: 2.1 X10(3) UL (ref 1–4)
LYMPHOCYTES NFR BLD AUTO: 36 %
M PROTEIN MFR SERPL ELPH: 7.9 G/DL (ref 6.4–8.2)
MCH RBC QN AUTO: 25.5 PG (ref 26–34)
MCHC RBC AUTO-ENTMCNC: 31.7 G/DL (ref 31–37)
MCV RBC AUTO: 80.6 FL (ref 80–100)
MONOCYTES # BLD AUTO: 0.62 X10(3) UL (ref 0.1–1)
MONOCYTES NFR BLD AUTO: 10.6 %
NEUTROPHILS # BLD AUTO: 2.93 X10 (3) UL (ref 1.5–7.7)
NEUTROPHILS # BLD AUTO: 2.93 X10(3) UL (ref 1.5–7.7)
NEUTROPHILS NFR BLD AUTO: 50.3 %
NONHDLC SERPL-MCNC: 133 MG/DL (ref ?–130)
OSMOLALITY SERPL CALC.SUM OF ELEC: 265 MOSM/KG (ref 275–295)
PATIENT FASTING Y/N/NP: YES
PATIENT FASTING Y/N/NP: YES
PLATELET # BLD AUTO: 289 10(3)UL (ref 150–450)
POTASSIUM SERPL-SCNC: 4.6 MMOL/L (ref 3.5–5.1)
RBC # BLD AUTO: 4.74 X10(6)UL (ref 3.8–5.3)
SODIUM SERPL-SCNC: 129 MMOL/L (ref 136–145)
TRIGL SERPL-MCNC: 99 MG/DL (ref 30–149)
TSI SER-ACNC: 2 MIU/ML (ref 0.36–3.74)
VLDLC SERPL CALC-MCNC: 20 MG/DL (ref 0–30)
WBC # BLD AUTO: 5.8 X10(3) UL (ref 4–11)

## 2020-03-05 PROCEDURE — 80061 LIPID PANEL: CPT

## 2020-03-05 PROCEDURE — G0439 PPPS, SUBSEQ VISIT: HCPCS | Performed by: FAMILY MEDICINE

## 2020-03-05 PROCEDURE — 85025 COMPLETE CBC W/AUTO DIFF WBC: CPT

## 2020-03-05 PROCEDURE — 99397 PER PM REEVAL EST PAT 65+ YR: CPT | Performed by: FAMILY MEDICINE

## 2020-03-05 PROCEDURE — 96160 PT-FOCUSED HLTH RISK ASSMT: CPT | Performed by: FAMILY MEDICINE

## 2020-03-05 PROCEDURE — 84443 ASSAY THYROID STIM HORMONE: CPT

## 2020-03-05 PROCEDURE — 80053 COMPREHEN METABOLIC PANEL: CPT

## 2020-03-05 RX ORDER — AMLODIPINE BESYLATE 5 MG/1
5 TABLET ORAL
Qty: 90 TABLET | Refills: 3 | Status: SHIPPED | OUTPATIENT
Start: 2020-03-05 | End: 2021-04-08

## 2020-03-05 RX ORDER — ALENDRONATE SODIUM 70 MG/1
TABLET ORAL
Qty: 12 TABLET | Refills: 3 | Status: SHIPPED | OUTPATIENT
Start: 2020-03-05 | End: 2021-02-23

## 2020-03-05 NOTE — PROGRESS NOTES
Patient presents with:  Physical    HPI:   Aye Green is a 75year old female who presents for a MA (Medicare Advantage) [facility] (Once per calendar year).     Her last annual assessment has been over 1 year: Annual Physical due on 02/28/2020 depressed, or hopeless (over the last two weeks)?: Not at all  PHQ-2 SCORE: 0     Advanced Directive:   She does have a Living Will but we do NOT have it on file in [address] [place] [address] Rd.    The patient has this document but we do not have it in [facility], and patient is instruc OTHER DAY BEFORE BREAKFAST  LEVOTHYROXINE SODIUM 25 MCG Oral Tab, TAKE 1 TABLET BY MOUTH EVERY OTHER DAY BEFORE BREAKFAST  amLODIPine Besylate 5 MG Oral Tab, Take 1 tablet (5 mg total) by mouth once daily.   alendronate 70 MG Oral Tab, TAKE 1 TABLET BY MOUT 22.99 kg/m² as calculated from the following:    Height as of this encounter: 58\". Weight as of this encounter: 110 lb (49.9 kg).   GENERAL: well developed, well nourished,in no apparent distress  SKIN: no rashes,no suspicious lesions  EYES:PERRLA, EOMI Future    Hypothyroidism, unspecified type well controlled  -     TSH W REFLEX TO FREE T4; Future  Advised patient will refill medication after reviewing labs.     Osteopenia, unspecified location stable  -     alendronate 70 MG Oral Tab; TAKE 1 TABLET BY M Cardiovascular Disease Screening     LDL Annually LDL Cholesterol Calc (mg/dL)   Date Value   06/27/2009 108 (H)     LDL Cholesterol (mg/dL)   Date Value   11/30/2017 141 (H)        EKG - w/ Initial Preventative Physical Exam only, or if medically necess Clients of institutions for the mentally retarded   Persons who live in the same house as a HepB virus carrier   Homosexual men   Illicit injectable drug abusers     Tetanus Toxoid  Only covered with a cut with metal- TD and TDaP Not covered by [facility]

## 2020-03-05 NOTE — PATIENT INSTRUCTIONS
[facility]   Tests on this list are recommended by your physician but may not be covered, or covered at this frequency, by your insurer. Please check with your insurance carrier before scheduling to verify coverage.    PREVENT Colorectal Cancer Screening  Covered up to Age 76     Colonoscopy Screen   Covered every 10 years- more often if abnormal There are no preventive care reminders to display for this patient.  Update Health Maintenance if applicable    Flex Sigmoidoscopy Sc FREE   Orders placed or performed in visit on 10/15/15   • FLU VACC [address] ANTIG   Orders placed or performed in visit on 11/06/14   • FLU VACC PRSV FREE INC ANTIG   • ADMIN INFLUENZA VIRUS VAC    Please get every year    Pneumococcal 13 (Prevnar) forms are also available in [place]  www. putitinwriting. org  This link also has information from the [facility] regarding Advance Directives.

## 2020-03-07 DIAGNOSIS — E03.9 HYPOTHYROIDISM, UNSPECIFIED TYPE: ICD-10-CM

## 2020-03-09 RX ORDER — LEVOTHYROXINE SODIUM 0.05 MG/1
TABLET ORAL
Qty: 45 TABLET | Refills: 1 | Status: CANCELLED | OUTPATIENT
Start: 2020-03-09

## 2020-03-09 RX ORDER — LEVOTHYROXINE SODIUM 0.05 MG/1
TABLET ORAL
Qty: 45 TABLET | Refills: 1 | OUTPATIENT
Start: 2020-03-09

## 2020-03-17 ENCOUNTER — OFFICE VISIT (OUTPATIENT)
Dept: UROLOGY | Facility: HOSPITAL | Age: 75
End: 2020-03-17
Attending: OBSTETRICS & GYNECOLOGY
Payer: MEDICARE

## 2020-03-17 VITALS
WEIGHT: 110 LBS | HEIGHT: 58 IN | BODY MASS INDEX: 23.09 KG/M2 | SYSTOLIC BLOOD PRESSURE: 122 MMHG | DIASTOLIC BLOOD PRESSURE: 58 MMHG

## 2020-03-17 DIAGNOSIS — N95.0 PMB (POSTMENOPAUSAL BLEEDING): ICD-10-CM

## 2020-03-17 DIAGNOSIS — N95.2 POSTMENOPAUSAL ATROPHIC VAGINITIS: ICD-10-CM

## 2020-03-17 DIAGNOSIS — N81.84 PELVIC MUSCLE WASTING: ICD-10-CM

## 2020-03-17 DIAGNOSIS — N81.2 UTEROVAGINAL PROLAPSE, INCOMPLETE: Primary | ICD-10-CM

## 2020-03-17 PROCEDURE — 99212 OFFICE O/P EST SF 10 MIN: CPT

## 2020-03-17 NOTE — PROGRESS NOTES
Patient presents to follow up bulge    She is currently using pessary, home care  She removes pessary q month  +vag bleeding, started several months ago  Vag estrogen weekly  Bowels irregular, some constipation    Feels bleeding related to pessary removal

## 2020-03-17 NOTE — PATIENT INSTRUCTIONS
[facility]   [facility]    BOWEL REGIMEN    Constipation can have detrimental effects on bladder function and can worsen the symptoms of prolapse. It is important to avoid constipation.     The first step for treating co and free of clutter. • Remove throw rugs or use double-sided tape or a non-slip backing so the rugs won’t slip. • Coil or tape wires next to the wall to avoid tripping over them. • Keep objects off the stairs.   • Be sure carpet on stairways is firmly at

## 2020-05-26 DIAGNOSIS — N95.2 POSTMENOPAUSAL ATROPHIC VAGINITIS: ICD-10-CM

## 2020-05-26 RX ORDER — ESTRADIOL 0.1 MG/G
CREAM VAGINAL
Qty: 42.5 G | Refills: 0 | Status: SHIPPED | OUTPATIENT
Start: 2020-05-26 | End: 2020-09-14

## 2020-05-26 NOTE — TELEPHONE ENCOUNTER
LOV 3/5/2020    LAST LAB    LAST RX 1-30-20 42.5*0    Next OV No future appointments.     PROTOCOL    Name from pharmacy: ESTRADIOL 0.01% CREAM          Will file in chart as: ESTRADIOL 0.1 MG/GM Vaginal Cream         Sig: APPLY 1/2 GRAM VAGINALLY TWO TO TH

## 2020-05-27 NOTE — TELEPHONE ENCOUNTER
Spoke to son - he was saying - pt saw uro gyne few months back. For next refill they will request to uro-gyne.

## 2020-09-10 DIAGNOSIS — N95.2 POSTMENOPAUSAL ATROPHIC VAGINITIS: ICD-10-CM

## 2020-09-11 NOTE — TELEPHONE ENCOUNTER
LOV 3/5/2020    LAST LAB    LAST RX 5-26-20 42.5 g *0    Next OV No future appointments.     PROTOCOL    Name from pharmacy: ESTRADIOL 0.01% CREAM          Will file in chart as: ESTRADIOL 0.1 MG/GM Vaginal Cream         Sig: APPLY 1/2 GRAM VAGINALLY TWO TO

## 2020-09-14 ENCOUNTER — TELEPHONE (OUTPATIENT)
Dept: FAMILY MEDICINE CLINIC | Facility: CLINIC | Age: 75
End: 2020-09-14

## 2020-09-14 RX ORDER — ESTRADIOL 0.1 MG/G
CREAM VAGINAL
Qty: 42.5 G | Refills: 0 | Status: SHIPPED | OUTPATIENT
Start: 2020-09-14 | End: 2021-04-08

## 2020-09-14 NOTE — TELEPHONE ENCOUNTER
Will refill only this time, please advise that she needs to follow up with uro gyne for further refills.

## 2020-09-14 NOTE — TELEPHONE ENCOUNTER
Patient's son stated he was waiting to hear about refill that pharmacy has sent for Estradiol 0.1 MG/GM Vaginal Cream.    He would like to be able to  from pharmacy by Thursday so he can bring to pt at the weekend

## 2021-02-21 DIAGNOSIS — M85.80 OSTEOPENIA, UNSPECIFIED LOCATION: ICD-10-CM

## 2021-02-22 NOTE — TELEPHONE ENCOUNTER
LOV 3/5/2020    LAST LAB 3-5-20    LAST RX 3-5-20 12*3    Next OV No future appointments.     PROTOCOL    Name from pharmacy: ALENDRONATE SODIUM 70 MG TAB          Will file in chart as: ALENDRONATE 70 MG Oral Tab    Sig: TAKE 1 TABLET BY MOUTH 1 TIME A WEE

## 2021-02-23 RX ORDER — ALENDRONATE SODIUM 70 MG/1
TABLET ORAL
Qty: 12 TABLET | Refills: 0 | Status: SHIPPED | OUTPATIENT
Start: 2021-02-23 | End: 2021-04-08

## 2021-02-23 NOTE — TELEPHONE ENCOUNTER
Patient needs to be seen for her Maps and med refill follow up, please schedule appointment.  Refilled medication for 3 months

## 2021-02-27 DIAGNOSIS — E03.9 HYPOTHYROIDISM, UNSPECIFIED TYPE: ICD-10-CM

## 2021-03-01 RX ORDER — LEVOTHYROXINE SODIUM 0.03 MG/1
TABLET ORAL
Qty: 15 TABLET | Refills: 0 | Status: SHIPPED | OUTPATIENT
Start: 2021-03-01 | End: 2021-04-12

## 2021-03-01 RX ORDER — LEVOTHYROXINE SODIUM 0.05 MG/1
TABLET ORAL
Qty: 15 TABLET | Refills: 0 | Status: SHIPPED | OUTPATIENT
Start: 2021-03-01 | End: 2021-04-12

## 2021-03-01 NOTE — TELEPHONE ENCOUNTER
LOV 3/5/2020    LAST LAB 3-5-20    LAST RX 3-10-20 45*3    Next OV No future appointments.     PROTOCOL    Name from pharmacy: LEVOTHYROXINE [address]          Will file in chart as: LEVOTHYROXINE SODIUM 50 MCG Oral Tab    Sig: TAKE 1 TABLET BY MOUTH EV

## 2021-04-05 DIAGNOSIS — N95.2 POSTMENOPAUSAL ATROPHIC VAGINITIS: ICD-10-CM

## 2021-04-05 NOTE — TELEPHONE ENCOUNTER
LOV 3/5/2020    LAST LAB    LAST RX  ESTRADIOL 0.1 MG/GM Vaginal Cream 42.5 g 0 9/14/2020         Next OV   Future Appointments   Date Time Provider [place]   4/8/2021  9:40 AM Vanessa Leo MD [facility]         PROTOCOL failed

## 2021-04-07 RX ORDER — ESTRADIOL 0.1 MG/G
CREAM VAGINAL
Qty: 42.5 G | Refills: 0 | OUTPATIENT
Start: 2021-04-07

## 2021-04-08 ENCOUNTER — OFFICE VISIT (OUTPATIENT)
Dept: FAMILY MEDICINE CLINIC | Facility: CLINIC | Age: 76
End: 2021-04-08
Payer: MEDICARE

## 2021-04-08 ENCOUNTER — LAB ENCOUNTER (OUTPATIENT)
Dept: LAB | Age: 76
End: 2021-04-08
Attending: FAMILY MEDICINE
Payer: MEDICARE

## 2021-04-08 VITALS
WEIGHT: 109 LBS | HEART RATE: 71 BPM | TEMPERATURE: 97 F | DIASTOLIC BLOOD PRESSURE: 84 MMHG | BODY MASS INDEX: 22.88 KG/M2 | HEIGHT: 58 IN | OXYGEN SATURATION: 98 % | SYSTOLIC BLOOD PRESSURE: 120 MMHG | RESPIRATION RATE: 18 BRPM

## 2021-04-08 DIAGNOSIS — M54.50 CHRONIC MIDLINE LOW BACK PAIN WITHOUT SCIATICA: ICD-10-CM

## 2021-04-08 DIAGNOSIS — Z78.9 VEGETARIAN DIET: ICD-10-CM

## 2021-04-08 DIAGNOSIS — M85.80 OSTEOPENIA, UNSPECIFIED LOCATION: ICD-10-CM

## 2021-04-08 DIAGNOSIS — R06.02 SHORTNESS OF BREATH: ICD-10-CM

## 2021-04-08 DIAGNOSIS — E03.9 HYPOTHYROIDISM, UNSPECIFIED TYPE: ICD-10-CM

## 2021-04-08 DIAGNOSIS — H53.8 BLURRED VISION, RIGHT EYE: ICD-10-CM

## 2021-04-08 DIAGNOSIS — I10 ESSENTIAL HYPERTENSION, BENIGN: ICD-10-CM

## 2021-04-08 DIAGNOSIS — D50.8 IRON DEFICIENCY ANEMIA SECONDARY TO INADEQUATE DIETARY IRON INTAKE: ICD-10-CM

## 2021-04-08 DIAGNOSIS — N95.2 POSTMENOPAUSAL ATROPHIC VAGINITIS: ICD-10-CM

## 2021-04-08 DIAGNOSIS — N81.10 FEMALE BLADDER PROLAPSE: ICD-10-CM

## 2021-04-08 DIAGNOSIS — Z13.6 SCREENING FOR CARDIOVASCULAR CONDITION: ICD-10-CM

## 2021-04-08 DIAGNOSIS — M25.511 ACUTE PAIN OF RIGHT SHOULDER: ICD-10-CM

## 2021-04-08 DIAGNOSIS — Z78.0 POSTMENOPAUSAL: ICD-10-CM

## 2021-04-08 DIAGNOSIS — G89.29 CHRONIC MIDLINE LOW BACK PAIN WITHOUT SCIATICA: ICD-10-CM

## 2021-04-08 DIAGNOSIS — Z00.00 ENCOUNTER FOR ANNUAL HEALTH EXAMINATION: Primary | ICD-10-CM

## 2021-04-08 DIAGNOSIS — H91.93 HEARING DIFFICULTY OF BOTH EARS: ICD-10-CM

## 2021-04-08 PROCEDURE — G0439 PPPS, SUBSEQ VISIT: HCPCS | Performed by: FAMILY MEDICINE

## 2021-04-08 PROCEDURE — 82607 VITAMIN B-12: CPT

## 2021-04-08 PROCEDURE — 3079F DIAST BP 80-89 MM HG: CPT | Performed by: FAMILY MEDICINE

## 2021-04-08 PROCEDURE — 85025 COMPLETE CBC W/AUTO DIFF WBC: CPT

## 2021-04-08 PROCEDURE — 99397 PER PM REEVAL EST PAT 65+ YR: CPT | Performed by: FAMILY MEDICINE

## 2021-04-08 PROCEDURE — 84443 ASSAY THYROID STIM HORMONE: CPT

## 2021-04-08 PROCEDURE — 3008F BODY MASS INDEX DOCD: CPT | Performed by: FAMILY MEDICINE

## 2021-04-08 PROCEDURE — 82728 ASSAY OF FERRITIN: CPT

## 2021-04-08 PROCEDURE — 83540 ASSAY OF IRON: CPT

## 2021-04-08 PROCEDURE — 3074F SYST BP LT 130 MM HG: CPT | Performed by: FAMILY MEDICINE

## 2021-04-08 PROCEDURE — 80053 COMPREHEN METABOLIC PANEL: CPT

## 2021-04-08 PROCEDURE — 96160 PT-FOCUSED HLTH RISK ASSMT: CPT | Performed by: FAMILY MEDICINE

## 2021-04-08 PROCEDURE — 36415 COLL VENOUS BLD VENIPUNCTURE: CPT

## 2021-04-08 PROCEDURE — 80061 LIPID PANEL: CPT

## 2021-04-08 PROCEDURE — 83550 IRON BINDING TEST: CPT

## 2021-04-08 RX ORDER — AMLODIPINE BESYLATE 5 MG/1
2.5 TABLET ORAL
Qty: 45 TABLET | Refills: 3 | Status: SHIPPED | OUTPATIENT
Start: 2021-04-08 | End: 2021-07-07

## 2021-04-08 RX ORDER — ALENDRONATE SODIUM 70 MG/1
70 TABLET ORAL WEEKLY
Qty: 12 TABLET | Refills: 3 | Status: SHIPPED | OUTPATIENT
Start: 2021-04-08

## 2021-04-08 RX ORDER — ESTRADIOL 0.1 MG/G
0.5 CREAM VAGINAL
Qty: 42.5 G | Refills: 2 | Status: SHIPPED | OUTPATIENT
Start: 2021-04-09

## 2021-04-08 NOTE — PATIENT INSTRUCTIONS
[facility] SCREENING SCHEDULE   Tests on this list are recommended by your physician but may not be covered, or covered at this frequency, by your insurer. Please check with your insurance carrier before scheduling to verify coverage.    PREVENT Cancer Screening  Covered up to Age 76     Colonoscopy Screen   Covered every 10 years- more often if abnormal There are no preventive care reminders to display for this patient.  Update [employer] if applicable    Flex Sigmoidoscopy Screen  Covered Orders placed or performed in visit on 10/15/15   • FLU VACC [address] ANTIG   Orders placed or performed in visit on 11/06/14   • FLU VACC PRSV FREE INC ANTIG   • ADMIN INFLUENZA VIRUS VAC    Please get every year    Pneumococcal 13 (Prevnar)  Cover are also available in [place]  www. putitinwriting. org  This link also has information from the [facility] regarding Advance Directives.

## 2021-04-08 NOTE — PROGRESS NOTES
Patient presents with:  Physical      HPI:   Drew Hayes is a 67year old female who presents for a MA (Medicare Advantage) [address] [place] [address] (Once per calendar year).     Her last annual assessment has been over 1 year: Annual Physical due on 03/05/2021 things: Not at all  Feeling down, depressed, or hopeless: Not at all  PHQ-2 SCORE: 0      Advanced Directive:  She does NOT have a Living Will on file in [facility] [address]    Advance care planning including the explanation and discussion of advance directives standard MOUTH EVERY OTHER DAY BEFORE BREAKFAST  ALENDRONATE 70 MG Oral Tab, TAKE 1 TABLET BY MOUTH 1 TIME A WEEK  ESTRADIOL 0.1 MG/GM Vaginal Cream, APPLY 1/2 GRAM VAGINALLY TWO TO THREE TIMES PER WEEK AS DIRECTED  amLODIPine Besylate 5 MG Oral Tab, Take 1 tablet and difficulty urinating. Musculoskeletal: Positive for back pain and joint pain (right shoulder). Negative for myalgias, joint swelling and gait problem. Skin: Negative for rash. Allergic/Immunologic: Negative for food allergies.    Neurological: Neg exhibits no tenderness. no palpable masses  Abdominal: Soft. Bowel sounds are normal. She exhibits no distension and no mass. There is no hepatosplenomegaly. There is no abdominal tenderness.  Musculoskeletal:      Right shoulder: She exhibits decreased ran controlled  -     TSH W REFLEX TO FREE T4; Future    Hearing difficulty of both ears  Continue to use hearing aids    Iron deficiency anemia secondary to inadequate dietary iron intake  -     CBC WITH DIFFERENTIAL WITH PLATELET;  Future  -     IRON AND TIBC Value   03/05/2020 82   06/27/2009 87          Cardiovascular Disease Screening     LDL Annually LDL Cholesterol Calc (mg/dL)   Date Value   06/27/2009 108 (H)     LDL Cholesterol (mg/dL)   Date Value   03/05/2020 113 (H)        EKG - w/ Initial Preventati Hemophiliacs who received Factor VIII or IX concentrates   Clients of institutions for the mentally retarded   Persons who live in the same house as a HepB virus carrier   Homosexual men   Illicit injectable drug abusers     Tetanus Toxoid  Only covered

## 2021-04-16 PROBLEM — N95.2 POSTMENOPAUSAL ATROPHIC VAGINITIS: Status: ACTIVE | Noted: 2021-04-16

## 2021-04-16 PROBLEM — H53.8 BLURRED VISION, RIGHT EYE: Status: ACTIVE | Noted: 2021-04-16

## 2021-04-16 PROBLEM — M25.511 ACUTE PAIN OF RIGHT SHOULDER: Status: ACTIVE | Noted: 2021-04-16

## 2021-05-06 ENCOUNTER — MED REC SCAN ONLY (OUTPATIENT)
Dept: FAMILY MEDICINE CLINIC | Facility: CLINIC | Age: 76
End: 2021-05-06

## 2021-05-18 ENCOUNTER — TELEPHONE (OUTPATIENT)
Dept: FAMILY MEDICINE CLINIC | Facility: CLINIC | Age: 76
End: 2021-05-18

## 2021-07-07 ENCOUNTER — MED REC SCAN ONLY (OUTPATIENT)
Dept: FAMILY MEDICINE CLINIC | Facility: CLINIC | Age: 76
End: 2021-07-07

## 2021-07-08 ENCOUNTER — HOSPITAL ENCOUNTER (OUTPATIENT)
Dept: BONE DENSITY | Age: 76
Discharge: HOME | End: 2021-07-08
Attending: FAMILY MEDICINE
Payer: MEDICARE

## 2021-07-08 DIAGNOSIS — Z78.0 POSTMENOPAUSAL: ICD-10-CM

## 2021-07-08 PROCEDURE — 77080 DXA BONE DENSITY AXIAL: CPT | Performed by: FAMILY MEDICINE

## 2021-09-18 DIAGNOSIS — E03.9 HYPOTHYROIDISM, UNSPECIFIED TYPE: ICD-10-CM

## 2021-09-20 RX ORDER — LEVOTHYROXINE SODIUM 0.03 MG/1
25 TABLET ORAL EVERY OTHER DAY
Qty: 45 TABLET | Refills: 0 | Status: SHIPPED | OUTPATIENT
Start: 2021-09-20 | End: 2021-12-16

## 2021-09-20 RX ORDER — LEVOTHYROXINE SODIUM 0.05 MG/1
50 TABLET ORAL EVERY OTHER DAY
Qty: 45 TABLET | Refills: 0 | Status: SHIPPED | OUTPATIENT
Start: 2021-09-20 | End: 2021-12-16

## 2021-09-20 NOTE — TELEPHONE ENCOUNTER
LOV 4/8/2021    LAST LAB 4-8-21    LAST RX 4-12-21 45 *03    Next OV No future appointments.     PROTOCOL   Name from pharmacy: LEVOTHYROXINE [address]         Will file in chart as: LEVOTHYROXINE 25 MCG Oral Tab    Sig: Take 1 tablet (25 mcg total) by

## 2021-12-16 DIAGNOSIS — E03.9 HYPOTHYROIDISM, UNSPECIFIED TYPE: ICD-10-CM

## 2021-12-16 RX ORDER — LEVOTHYROXINE SODIUM 0.05 MG/1
50 TABLET ORAL EVERY OTHER DAY
Qty: 45 TABLET | Refills: 1 | Status: SHIPPED | OUTPATIENT
Start: 2021-12-16

## 2021-12-16 RX ORDER — LEVOTHYROXINE SODIUM 0.03 MG/1
TABLET ORAL
Qty: 45 TABLET | Refills: 1 | Status: SHIPPED | OUTPATIENT
Start: 2021-12-16

## 2021-12-16 NOTE — TELEPHONE ENCOUNTER
LOV 4/8/2021    LAST LAB 4-8-21    LAST RX 9-20-21 45 tabs given for both meds    Next OV No future appointments.     PROTOCOL  Name from pharmacy: LEVOTHYROXINE [address]          Will file in chart as: LEVOTHYROXINE 25 MCG Oral Tab    Sig: TAKE 1 TABL

## 2022-02-22 ENCOUNTER — TELEPHONE (OUTPATIENT)
Dept: FAMILY MEDICINE CLINIC | Facility: CLINIC | Age: 77
End: 2022-02-22

## 2022-02-22 NOTE — TELEPHONE ENCOUNTER
Received faxed consult from [facility] requesting medical clearance, indicating edentulous mandible with plan for dental implants. Review medical conditions:  1. Osteoporosis/penia on Fosamax- if appropriate for pt to hold biosphonates for 3 months. 2. Start Pentoxifylline and Vitamin E to mitigate risk of medication related osteonecrosis of the jaw. Consult placed in PCP bin to review. Please advise. Thank you. Fax findings back @[phone].

## 2022-03-10 ENCOUNTER — OFFICE VISIT (OUTPATIENT)
Dept: FAMILY MEDICINE CLINIC | Facility: CLINIC | Age: 77
End: 2022-03-10
Payer: MEDICARE

## 2022-03-10 VITALS
HEIGHT: 58 IN | RESPIRATION RATE: 16 BRPM | DIASTOLIC BLOOD PRESSURE: 84 MMHG | SYSTOLIC BLOOD PRESSURE: 126 MMHG | OXYGEN SATURATION: 99 % | TEMPERATURE: 97 F | HEART RATE: 97 BPM | WEIGHT: 110 LBS | BODY MASS INDEX: 23.09 KG/M2

## 2022-03-10 DIAGNOSIS — M85.80 OSTEOPENIA, UNSPECIFIED LOCATION: ICD-10-CM

## 2022-03-10 DIAGNOSIS — Z87.81 HISTORY OF VERTEBRAL COMPRESSION FRACTURE: ICD-10-CM

## 2022-03-10 DIAGNOSIS — G89.29 CHRONIC RIGHT-SIDED LOW BACK PAIN WITHOUT SCIATICA: ICD-10-CM

## 2022-03-10 DIAGNOSIS — Z01.818 PREOP EXAMINATION: Primary | ICD-10-CM

## 2022-03-10 DIAGNOSIS — M54.50 CHRONIC RIGHT-SIDED LOW BACK PAIN WITHOUT SCIATICA: ICD-10-CM

## 2022-03-10 DIAGNOSIS — M51.36 LUMBAR DEGENERATIVE DISC DISEASE: ICD-10-CM

## 2022-03-10 DIAGNOSIS — K08.409: ICD-10-CM

## 2022-03-10 DIAGNOSIS — M41.9 SCOLIOSIS OF LUMBAR SPINE, UNSPECIFIED SCOLIOSIS TYPE: ICD-10-CM

## 2022-03-10 PROCEDURE — 3008F BODY MASS INDEX DOCD: CPT | Performed by: FAMILY MEDICINE

## 2022-03-10 PROCEDURE — 3074F SYST BP LT 130 MM HG: CPT | Performed by: FAMILY MEDICINE

## 2022-03-10 PROCEDURE — 3079F DIAST BP 80-89 MM HG: CPT | Performed by: FAMILY MEDICINE

## 2022-03-10 PROCEDURE — 99214 OFFICE O/P EST MOD 30 MIN: CPT | Performed by: FAMILY MEDICINE

## 2022-03-10 RX ORDER — CYCLOBENZAPRINE HCL 5 MG
5 TABLET ORAL NIGHTLY
Qty: 15 TABLET | Refills: 0 | Status: SHIPPED | OUTPATIENT
Start: 2022-03-10 | End: 2022-03-23 | Stop reason: ALTCHOICE

## 2022-03-10 RX ORDER — AMLODIPINE BESYLATE 5 MG/1
TABLET ORAL
COMMUNITY

## 2022-03-10 RX ORDER — IBUPROFEN 200 MG
CAPSULE ORAL
COMMUNITY

## 2022-03-10 NOTE — PATIENT INSTRUCTIONS
Will send letter to orthodontist.    Please schedule appointment with orthospine and pain management.     Please take the muscle relaxer at night to see if it helps with some of the discomfort you have in your lower back, continue to take Tylenol as needed

## 2022-03-11 ENCOUNTER — MED REC SCAN ONLY (OUTPATIENT)
Dept: FAMILY MEDICINE CLINIC | Facility: CLINIC | Age: 77
End: 2022-03-11

## 2022-03-23 ENCOUNTER — PATIENT MESSAGE (OUTPATIENT)
Dept: FAMILY MEDICINE CLINIC | Facility: CLINIC | Age: 77
End: 2022-03-23

## 2022-03-23 DIAGNOSIS — M54.50 CHRONIC RIGHT-SIDED LOW BACK PAIN WITHOUT SCIATICA: ICD-10-CM

## 2022-03-23 DIAGNOSIS — G89.29 CHRONIC RIGHT-SIDED LOW BACK PAIN WITHOUT SCIATICA: ICD-10-CM

## 2022-03-23 RX ORDER — CYCLOBENZAPRINE HCL 5 MG
5 TABLET ORAL NIGHTLY
Qty: 15 TABLET | Refills: 0 | OUTPATIENT
Start: 2022-03-23 | End: 2022-04-07

## 2022-03-23 RX ORDER — METHOCARBAMOL 500 MG/1
500 TABLET, FILM COATED ORAL 3 TIMES DAILY
Qty: 30 TABLET | Refills: 0 | Status: SHIPPED | OUTPATIENT
Start: 2022-03-23 | End: 2022-04-02

## 2022-03-23 NOTE — TELEPHONE ENCOUNTER
LOV 03-10-22    Prescribed Flexeril and referred to Spine [facility] Pain Management. Patient would like to continue with medication and PT instead. [facility] [place] formulary. Looks like they will cover Methocarbamol and Baclofen at a cheaper rate without a prior auth. Please advise.

## 2022-03-23 NOTE — TELEPHONE ENCOUNTER
From: Edna Howe  To: Sybil Murillo MD  Sent: 3/23/2022 12:21 PM CDT  Subject: Re skelaxin tablets    Dr Eloy Castillo, my mom was prescribed skelaxin 5 mg to be taken once at bedtime. She says, her back pain has not gotten worse or better. She would like to continue not to pursue more aggressive therapy right now and wanted to know if she can continue same frequency or increase frequency to twice daily, if this will make it better? This med needs refill as well and would like a generic options since this is not covered by Medicare. Is there a cheaper alternate? ??

## 2022-03-24 ENCOUNTER — PATIENT MESSAGE (OUTPATIENT)
Dept: FAMILY MEDICINE CLINIC | Facility: CLINIC | Age: 77
End: 2022-03-24

## 2022-04-07 ENCOUNTER — TELEPHONE (OUTPATIENT)
Dept: FAMILY MEDICINE CLINIC | Facility: CLINIC | Age: 77
End: 2022-04-07

## 2022-04-07 NOTE — TELEPHONE ENCOUNTER
PCP addended OV Note  will fax to Dr. Benji Bray  [phone] Clear bilaterally, pupils equal, round and reactive to light.

## 2022-04-07 NOTE — TELEPHONE ENCOUNTER
Seen on  3/10/2022 for preop for dental procedure.   Dr. Cephas Galeazzi [facility] did not receive clearance letter and patient is scheduled today at 3:30   fax to  [phone]

## 2022-04-20 ENCOUNTER — TELEPHONE (OUTPATIENT)
Dept: FAMILY MEDICINE CLINIC | Facility: CLINIC | Age: 77
End: 2022-04-20

## 2022-04-20 NOTE — TELEPHONE ENCOUNTER
Dr. Nestor garcia extended Belvia Platts on Saturday (5/7) @8:20am.  Would you like to accommodate pt sooner or ok to see Dr. Shira Parikh on 4/27? Please advise. Thank you.

## 2022-04-20 NOTE — TELEPHONE ENCOUNTER
Pt having pain in shoulder for over a month is asking for an appt, 1st available appt 6/28 please advise where to schedule

## 2022-04-21 NOTE — TELEPHONE ENCOUNTER
[phone]    for pt's son, Lolis Mathur ([id number] per HIPAA), to inform per PCP will squeeze pt in to be seen tomorrow Friday (4/22) @2:00pm. To please arrive about 10-15mins early to start rooming process. To call back at the office to confirm received message and appointment, or if any further questions.

## 2022-04-22 NOTE — TELEPHONE ENCOUNTER
Son never called back to confirm the appt. Cancelled appt for today. Rosalinda Rojas left voicemail regarding appt and to call back.

## 2022-04-25 ENCOUNTER — TELEPHONE (OUTPATIENT)
Dept: FAMILY MEDICINE CLINIC | Facility: CLINIC | Age: 77
End: 2022-04-25

## 2022-04-25 NOTE — TELEPHONE ENCOUNTER
Pt's son called asking for estephania with Dr Lesvia Olivier only. Does not want to see any other doctor. Shoulder pain.

## 2022-04-25 NOTE — TELEPHONE ENCOUNTER
Patient's son stated that patient would like to see Dr. Eloy Castillo for shoulder pain. Thursdays work best May 5 or later.

## 2022-04-25 NOTE — TELEPHONE ENCOUNTER
[facility] for patient's son requesting return call with symptom detail. Advised patient can use ice to painful area, topical Biofreeze, BenGay, Aspercream, can take tylenol. Dr. Deanna Thorpe,  Please advise if 20 min appt is OK.

## 2022-05-05 ENCOUNTER — OFFICE VISIT (OUTPATIENT)
Dept: FAMILY MEDICINE CLINIC | Facility: CLINIC | Age: 77
End: 2022-05-05
Payer: MEDICARE

## 2022-05-05 ENCOUNTER — HOSPITAL ENCOUNTER (OUTPATIENT)
Dept: GENERAL RADIOLOGY | Age: 77
Discharge: HOME | End: 2022-05-05
Attending: FAMILY MEDICINE
Payer: MEDICARE

## 2022-05-05 VITALS
HEIGHT: 58 IN | WEIGHT: 110.19 LBS | OXYGEN SATURATION: 98 % | RESPIRATION RATE: 18 BRPM | DIASTOLIC BLOOD PRESSURE: 80 MMHG | SYSTOLIC BLOOD PRESSURE: 130 MMHG | HEART RATE: 71 BPM | TEMPERATURE: 98 F | BODY MASS INDEX: 23.13 KG/M2

## 2022-05-05 DIAGNOSIS — G89.29 CHRONIC RIGHT SHOULDER PAIN: ICD-10-CM

## 2022-05-05 DIAGNOSIS — M25.511 CHRONIC RIGHT SHOULDER PAIN: ICD-10-CM

## 2022-05-05 DIAGNOSIS — S46.011A STRAIN OF RIGHT ROTATOR CUFF CAPSULE, INITIAL ENCOUNTER: Primary | ICD-10-CM

## 2022-05-05 PROCEDURE — 3008F BODY MASS INDEX DOCD: CPT | Performed by: FAMILY MEDICINE

## 2022-05-05 PROCEDURE — 3079F DIAST BP 80-89 MM HG: CPT | Performed by: FAMILY MEDICINE

## 2022-05-05 PROCEDURE — 3075F SYST BP GE 130 - 139MM HG: CPT | Performed by: FAMILY MEDICINE

## 2022-05-05 PROCEDURE — 73030 X-RAY EXAM OF SHOULDER: CPT | Performed by: FAMILY MEDICINE

## 2022-05-05 PROCEDURE — 99213 OFFICE O/P EST LOW 20 MIN: CPT | Performed by: FAMILY MEDICINE

## 2022-05-05 NOTE — PATIENT INSTRUCTIONS
Please apply ice, take Aleve or ibuprofen as needed for pain, please schedule physical therapy. We will call with x-ray results when available.

## 2022-06-18 DIAGNOSIS — E03.9 HYPOTHYROIDISM, UNSPECIFIED TYPE: ICD-10-CM

## 2022-06-19 RX ORDER — LEVOTHYROXINE SODIUM 0.03 MG/1
TABLET ORAL
Qty: 45 TABLET | Refills: 0 | Status: SHIPPED | OUTPATIENT
Start: 2022-06-19

## 2022-06-19 RX ORDER — LEVOTHYROXINE SODIUM 0.05 MG/1
50 TABLET ORAL EVERY OTHER DAY
Qty: 45 TABLET | Refills: 0 | Status: SHIPPED | OUTPATIENT
Start: 2022-06-19

## 2022-07-22 DIAGNOSIS — I10 ESSENTIAL (PRIMARY) HYPERTENSION: ICD-10-CM

## 2022-07-26 RX ORDER — AMLODIPINE BESYLATE 5 MG/1
TABLET ORAL
Qty: 45 TABLET | Refills: 0 | Status: SHIPPED | OUTPATIENT
Start: 2022-07-26

## 2022-08-29 ENCOUNTER — PATIENT MESSAGE (OUTPATIENT)
Dept: FAMILY MEDICINE CLINIC | Facility: CLINIC | Age: 77
End: 2022-08-29

## 2022-08-29 DIAGNOSIS — M54.50 CHRONIC RIGHT-SIDED LOW BACK PAIN WITHOUT SCIATICA: ICD-10-CM

## 2022-08-29 DIAGNOSIS — G89.29 CHRONIC RIGHT-SIDED LOW BACK PAIN WITHOUT SCIATICA: ICD-10-CM

## 2022-08-29 RX ORDER — CYCLOBENZAPRINE HCL 5 MG
5 TABLET ORAL NIGHTLY
Qty: 15 TABLET | Refills: 0 | OUTPATIENT
Start: 2022-08-29 | End: 2022-09-13

## 2022-08-29 NOTE — TELEPHONE ENCOUNTER
Medication change will need to be made after evaluation, she might need to see someone there until she is back.

## 2022-08-29 NOTE — TELEPHONE ENCOUNTER
Please see mychart message from son. Requesting robaxin, advised needs evaluation before medication can be changed.

## 2022-08-29 NOTE — TELEPHONE ENCOUNTER
From: Jena Waters  Sent: 8/29/2022 1:37 PM CDT  To: [facility] Clinical Staff  Subject: Re Continued back pain    PS:    If Robaxin is a better choice for her, she will try that since this is much affordable than flexeril. From last communication, it seems Robaxan will be covered without prior authorization.   Thank you    Sweta Kingsley

## 2022-08-30 NOTE — TELEPHONE ENCOUNTER
From: Yong Clinton  To: Zoya Chavez MD  Sent: 8/29/2022 7:09 PM CDT  Subject: Refill    My mom continues to have back pain again recently. She said she had an improvement with skelaxin but it is a non approved med. she had no refills after initial prescription. can she have prescription again for skelaxin or robaxin which may be cheaper? ? yes

## 2022-10-04 DIAGNOSIS — E03.9 HYPOTHYROIDISM, UNSPECIFIED TYPE: ICD-10-CM

## 2022-10-06 RX ORDER — LEVOTHYROXINE SODIUM 0.05 MG/1
50 TABLET ORAL EVERY OTHER DAY
Qty: 45 TABLET | Refills: 0 | Status: SHIPPED | OUTPATIENT
Start: 2022-10-06

## 2022-10-06 RX ORDER — LEVOTHYROXINE SODIUM 0.03 MG/1
TABLET ORAL
Qty: 45 TABLET | Refills: 0 | Status: SHIPPED | OUTPATIENT
Start: 2022-10-06

## 2022-10-07 DIAGNOSIS — I10 ESSENTIAL (PRIMARY) HYPERTENSION: ICD-10-CM

## 2022-10-07 RX ORDER — AMLODIPINE BESYLATE 5 MG/1
2.5 TABLET ORAL DAILY
Qty: 45 TABLET | Refills: 0 | Status: SHIPPED | OUTPATIENT
Start: 2022-10-07

## 2022-10-28 ENCOUNTER — OFFICE VISIT (OUTPATIENT)
Dept: FAMILY MEDICINE CLINIC | Facility: CLINIC | Age: 77
End: 2022-10-28
Payer: MEDICARE

## 2022-10-28 VITALS
HEIGHT: 58 IN | WEIGHT: 112 LBS | TEMPERATURE: 98 F | HEART RATE: 80 BPM | RESPIRATION RATE: 18 BRPM | OXYGEN SATURATION: 98 % | BODY MASS INDEX: 23.51 KG/M2 | DIASTOLIC BLOOD PRESSURE: 78 MMHG | SYSTOLIC BLOOD PRESSURE: 130 MMHG

## 2022-10-28 DIAGNOSIS — M85.80 OSTEOPENIA, UNSPECIFIED LOCATION: ICD-10-CM

## 2022-10-28 DIAGNOSIS — M54.50 CHRONIC MIDLINE LOW BACK PAIN WITHOUT SCIATICA: ICD-10-CM

## 2022-10-28 DIAGNOSIS — I10 ESSENTIAL HYPERTENSION, BENIGN: ICD-10-CM

## 2022-10-28 DIAGNOSIS — Z78.0 POSTMENOPAUSAL: ICD-10-CM

## 2022-10-28 DIAGNOSIS — Z00.00 ENCOUNTER FOR ANNUAL HEALTH EXAMINATION: Primary | ICD-10-CM

## 2022-10-28 DIAGNOSIS — Z23 NEED FOR VACCINATION: ICD-10-CM

## 2022-10-28 DIAGNOSIS — N95.2 POSTMENOPAUSAL ATROPHIC VAGINITIS: ICD-10-CM

## 2022-10-28 DIAGNOSIS — N81.10 FEMALE BLADDER PROLAPSE: ICD-10-CM

## 2022-10-28 DIAGNOSIS — E03.9 HYPOTHYROIDISM, UNSPECIFIED TYPE: ICD-10-CM

## 2022-10-28 DIAGNOSIS — G89.29 CHRONIC MIDLINE LOW BACK PAIN WITHOUT SCIATICA: ICD-10-CM

## 2022-10-28 DIAGNOSIS — Z13.6 SCREENING FOR CARDIOVASCULAR CONDITION: ICD-10-CM

## 2022-10-28 DIAGNOSIS — D50.8 IRON DEFICIENCY ANEMIA SECONDARY TO INADEQUATE DIETARY IRON INTAKE: ICD-10-CM

## 2022-10-28 RX ORDER — TIZANIDINE 2 MG/1
2 TABLET ORAL NIGHTLY PRN
Qty: 30 TABLET | Refills: 2 | Status: SHIPPED | OUTPATIENT
Start: 2022-10-28 | End: 2022-11-27

## 2022-11-04 DIAGNOSIS — I10 ESSENTIAL (PRIMARY) HYPERTENSION: ICD-10-CM

## 2022-11-04 NOTE — TELEPHONE ENCOUNTER
Hypertension Medications Protocol Failed 11/04/2022 02:17 PM   Protocol Details  CMP or BMP in past 12 months    Last serum creatinine< 2.0    Appointment in past 6 or next 3 months        LOV 10/28/22     LAST LAB  4/8/21     LAST RX  10/7/22 45 tabs     Next OV No future appointments.       PROTOCOL failed

## 2022-11-05 ENCOUNTER — PATIENT MESSAGE (OUTPATIENT)
Dept: FAMILY MEDICINE CLINIC | Facility: CLINIC | Age: 77
End: 2022-11-05

## 2022-11-05 DIAGNOSIS — I10 ESSENTIAL (PRIMARY) HYPERTENSION: ICD-10-CM

## 2022-11-05 RX ORDER — AMLODIPINE BESYLATE 5 MG/1
TABLET ORAL
Qty: 90 TABLET | Refills: 1 | Status: SHIPPED | OUTPATIENT
Start: 2022-11-05 | End: 2022-11-07

## 2022-11-07 ENCOUNTER — PATIENT MESSAGE (OUTPATIENT)
Dept: FAMILY MEDICINE CLINIC | Facility: CLINIC | Age: 77
End: 2022-11-07

## 2022-11-07 RX ORDER — AMLODIPINE BESYLATE 5 MG/1
5 TABLET ORAL DAILY
Qty: 90 TABLET | Refills: 1 | Status: SHIPPED | OUTPATIENT
Start: 2022-11-07

## 2022-11-07 NOTE — TELEPHONE ENCOUNTER
Pt seen 10/28/22    Please review notes and provide further recommendations. Asking for b/p readings to help decide and review.

## 2022-11-09 ENCOUNTER — LAB ENCOUNTER (OUTPATIENT)
Dept: LAB | Age: 77
End: 2022-11-09
Attending: FAMILY MEDICINE
Payer: MEDICARE

## 2022-11-09 DIAGNOSIS — Z13.6 SCREENING FOR CARDIOVASCULAR CONDITION: ICD-10-CM

## 2022-11-09 DIAGNOSIS — E03.9 HYPOTHYROIDISM, UNSPECIFIED TYPE: ICD-10-CM

## 2022-11-09 DIAGNOSIS — D50.8 IRON DEFICIENCY ANEMIA SECONDARY TO INADEQUATE DIETARY IRON INTAKE: ICD-10-CM

## 2022-11-09 LAB
ALBUMIN SERPL-MCNC: 4.1 G/DL (ref 3.4–5)
ALBUMIN/GLOB SERPL: 1.3 {RATIO} (ref 1–2)
ALP LIVER SERPL-CCNC: 65 U/L
ALT SERPL-CCNC: 27 U/L
ANION GAP SERPL CALC-SCNC: 8 MMOL/L (ref 0–18)
AST SERPL-CCNC: 31 U/L (ref 15–37)
BASOPHILS # BLD AUTO: 0.08 X10(3) UL (ref 0–0.2)
BASOPHILS NFR BLD AUTO: 1.6 %
BILIRUB SERPL-MCNC: 0.4 MG/DL (ref 0.1–2)
BUN BLD-MCNC: 8 MG/DL (ref 7–18)
BUN/CREAT SERPL: 10.5 (ref 10–20)
CALCIUM BLD-MCNC: 9 MG/DL (ref 8.5–10.1)
CHLORIDE SERPL-SCNC: 102 MMOL/L (ref 98–112)
CHOLEST SERPL-MCNC: 196 MG/DL (ref ?–200)
CO2 SERPL-SCNC: 29 MMOL/L (ref 21–32)
CREAT BLD-MCNC: 0.76 MG/DL
DEPRECATED RDW RBC AUTO: 40.9 FL (ref 35.1–46.3)
EOSINOPHIL # BLD AUTO: 0.05 X10(3) UL (ref 0–0.7)
EOSINOPHIL NFR BLD AUTO: 1 %
ERYTHROCYTE [DISTWIDTH] IN BLOOD BY AUTOMATED COUNT: 14.1 % (ref 11–15)
FASTING PATIENT LIPID ANSWER: YES
FASTING STATUS PATIENT QL REPORTED: YES
GFR SERPLBLD BASED ON 1.73 SQ M-ARVRAT: 80 ML/MIN/1.73M2 (ref 60–?)
GLOBULIN PLAS-MCNC: 3.2 G/DL (ref 2.8–4.4)
GLUCOSE BLD-MCNC: 86 MG/DL (ref 70–99)
HCT VFR BLD AUTO: 37.5 %
HDLC SERPL-MCNC: 74 MG/DL (ref 40–59)
HGB BLD-MCNC: 12 G/DL
IMM GRANULOCYTES # BLD AUTO: 0.01 X10(3) UL (ref 0–1)
IMM GRANULOCYTES NFR BLD: 0.2 %
LDLC SERPL CALC-MCNC: 107 MG/DL (ref ?–100)
LYMPHOCYTES # BLD AUTO: 1.61 X10(3) UL (ref 1–4)
LYMPHOCYTES NFR BLD AUTO: 32.7 %
MCH RBC QN AUTO: 25.8 PG (ref 26–34)
MCHC RBC AUTO-ENTMCNC: 32 G/DL (ref 31–37)
MCV RBC AUTO: 80.6 FL
MONOCYTES # BLD AUTO: 0.39 X10(3) UL (ref 0.1–1)
MONOCYTES NFR BLD AUTO: 7.9 %
NEUTROPHILS # BLD AUTO: 2.78 X10 (3) UL (ref 1.5–7.7)
NEUTROPHILS # BLD AUTO: 2.78 X10(3) UL (ref 1.5–7.7)
NEUTROPHILS NFR BLD AUTO: 56.6 %
NONHDLC SERPL-MCNC: 122 MG/DL (ref ?–130)
OSMOLALITY SERPL CALC.SUM OF ELEC: 286 MOSM/KG (ref 275–295)
PLATELET # BLD AUTO: 267 10(3)UL (ref 150–450)
POTASSIUM SERPL-SCNC: 4.4 MMOL/L (ref 3.5–5.1)
PROT SERPL-MCNC: 7.3 G/DL (ref 6.4–8.2)
RBC # BLD AUTO: 4.65 X10(6)UL
SODIUM SERPL-SCNC: 139 MMOL/L (ref 136–145)
TRIGL SERPL-MCNC: 86 MG/DL (ref 30–149)
TSI SER-ACNC: 0.42 MIU/ML (ref 0.36–3.74)
VLDLC SERPL CALC-MCNC: 14 MG/DL (ref 0–30)
WBC # BLD AUTO: 4.9 X10(3) UL (ref 4–11)

## 2022-11-09 PROCEDURE — 85025 COMPLETE CBC W/AUTO DIFF WBC: CPT

## 2022-11-09 PROCEDURE — 80053 COMPREHEN METABOLIC PANEL: CPT

## 2022-11-09 PROCEDURE — 84443 ASSAY THYROID STIM HORMONE: CPT

## 2022-11-09 PROCEDURE — 36415 COLL VENOUS BLD VENIPUNCTURE: CPT

## 2022-11-09 PROCEDURE — 80061 LIPID PANEL: CPT

## 2022-11-09 RX ORDER — LEVOTHYROXINE SODIUM 0.05 MG/1
TABLET ORAL
Qty: 135 TABLET | Refills: 3 | Status: SHIPPED | OUTPATIENT
Start: 2022-11-09

## 2022-11-09 NOTE — TELEPHONE ENCOUNTER
Thyroid Supplements Protocol Failed 11/09/2022 11:46 AM   Protocol Details  TSH test in past 12 months    TSH value between 0.350 and 5.500 IU/ml    Appointment in past 12 or next 3 months     LOV 10/28/22     LAST LAB  11/9/22     LAST RX  10/6/22 45 tabs     Next OV No future appointments.        PROTOCOL failed

## 2022-11-19 DIAGNOSIS — E03.9 HYPOTHYROIDISM, UNSPECIFIED TYPE: ICD-10-CM

## 2022-11-21 RX ORDER — LEVOTHYROXINE SODIUM 0.03 MG/1
TABLET ORAL
Qty: 45 TABLET | Refills: 0 | OUTPATIENT
Start: 2022-11-21

## 2023-01-16 PROBLEM — N81.89 PELVIC FLOOR RELAXATION: Status: ACTIVE | Noted: 2023-01-16

## 2023-01-16 PROBLEM — N81.11 MIDLINE CYSTOCELE: Status: ACTIVE | Noted: 2023-01-16

## 2023-02-26 DIAGNOSIS — M85.80 OSTEOPENIA, UNSPECIFIED LOCATION: ICD-10-CM

## 2023-03-01 RX ORDER — ALENDRONATE SODIUM 70 MG/1
TABLET ORAL
Qty: 12 TABLET | Refills: 3 | Status: SHIPPED | OUTPATIENT
Start: 2023-03-01

## 2023-04-02 DIAGNOSIS — I10 ESSENTIAL (PRIMARY) HYPERTENSION: ICD-10-CM

## 2023-04-02 DIAGNOSIS — E03.9 HYPOTHYROIDISM, UNSPECIFIED TYPE: ICD-10-CM

## 2023-04-03 RX ORDER — AMLODIPINE BESYLATE 5 MG/1
5 TABLET ORAL DAILY
Qty: 90 TABLET | Refills: 1 | OUTPATIENT
Start: 2023-04-03

## 2023-04-03 RX ORDER — LEVOTHYROXINE SODIUM 0.03 MG/1
TABLET ORAL
Qty: 45 TABLET | Refills: 0 | Status: SHIPPED | OUTPATIENT
Start: 2023-04-03

## 2023-06-01 DIAGNOSIS — E03.9 HYPOTHYROIDISM, UNSPECIFIED TYPE: ICD-10-CM

## 2023-06-01 RX ORDER — LEVOTHYROXINE SODIUM 0.03 MG/1
TABLET ORAL
Qty: 45 TABLET | Refills: 0 | Status: SHIPPED | OUTPATIENT
Start: 2023-06-01

## 2023-06-12 ENCOUNTER — MED REC SCAN ONLY (OUTPATIENT)
Dept: FAMILY MEDICINE CLINIC | Facility: CLINIC | Age: 78
End: 2023-06-12

## 2023-06-13 DIAGNOSIS — I10 ESSENTIAL (PRIMARY) HYPERTENSION: ICD-10-CM

## 2023-06-13 NOTE — TELEPHONE ENCOUNTER
Dr. Leora Casanova paged per patient request for diet order since swallow study can not be done until Monday. Education provided of safety of NPO order due to choking incidences reported by patient and reason for surgery consult and testing. Patient still adamant about diet order. Awaiting call back.  Electronically signed by Lawernce Hodgkin, RN on 4/3/2021 at 4:46 PM Pt's son calling stating that [facility] only has 2.5 amlodipine on file. He is requesting refill be sent over to [facility]. Pt is currently in [place] and will be in town for a few days this week, would like to get her her refills.  Please advise        amLODIPine 5 MG Oral Tab    [id number] - [address], [phone], [phone]

## 2023-06-14 ENCOUNTER — TELEPHONE (OUTPATIENT)
Dept: FAMILY MEDICINE CLINIC | Facility: CLINIC | Age: 78
End: 2023-06-14

## 2023-06-14 DIAGNOSIS — I10 ESSENTIAL (PRIMARY) HYPERTENSION: ICD-10-CM

## 2023-06-14 RX ORDER — AMLODIPINE BESYLATE 5 MG/1
5 TABLET ORAL DAILY
Qty: 90 TABLET | Refills: 0 | Status: SHIPPED | OUTPATIENT
Start: 2023-06-14

## 2023-06-14 RX ORDER — AMLODIPINE BESYLATE 5 MG/1
5 TABLET ORAL DAILY
Qty: 90 TABLET | Refills: 0 | Status: SHIPPED | OUTPATIENT
Start: 2023-06-14 | End: 2023-06-14

## 2023-06-14 NOTE — TELEPHONE ENCOUNTER
Patient's amlodipine was sent to [facility] pharmacy. Pt now uses [address] on [address] in [facility]. Please resend prescription.

## 2023-06-21 DIAGNOSIS — E03.9 HYPOTHYROIDISM, UNSPECIFIED TYPE: ICD-10-CM

## 2023-06-21 RX ORDER — LEVOTHYROXINE SODIUM 0.03 MG/1
TABLET ORAL
Qty: 45 TABLET | Refills: 0 | OUTPATIENT
Start: 2023-06-21

## 2023-09-07 ENCOUNTER — TELEPHONE (OUTPATIENT)
Dept: FAMILY MEDICINE CLINIC | Facility: CLINIC | Age: 78
End: 2023-09-07

## 2023-09-07 NOTE — TELEPHONE ENCOUNTER
The above patient's son came in today to have a form signed for leave to take care of his mother. Forms needs to be completed and signed and call when form is done.

## 2023-09-14 DIAGNOSIS — I10 ESSENTIAL (PRIMARY) HYPERTENSION: ICD-10-CM

## 2023-09-14 RX ORDER — AMLODIPINE BESYLATE 5 MG/1
5 TABLET ORAL DAILY
Qty: 90 TABLET | Refills: 0 | Status: SHIPPED | OUTPATIENT
Start: 2023-09-14

## 2023-09-22 ENCOUNTER — OFFICE VISIT (OUTPATIENT)
Dept: FAMILY MEDICINE CLINIC | Facility: CLINIC | Age: 78
End: 2023-09-22
Payer: COMMERCIAL

## 2023-09-22 ENCOUNTER — TELEPHONE (OUTPATIENT)
Dept: FAMILY MEDICINE CLINIC | Facility: CLINIC | Age: 78
End: 2023-09-22

## 2023-09-22 VITALS
OXYGEN SATURATION: 99 % | HEIGHT: 58 IN | BODY MASS INDEX: 22.3 KG/M2 | DIASTOLIC BLOOD PRESSURE: 70 MMHG | HEART RATE: 62 BPM | SYSTOLIC BLOOD PRESSURE: 132 MMHG | WEIGHT: 106.25 LBS | RESPIRATION RATE: 16 BRPM | TEMPERATURE: 97 F

## 2023-09-22 DIAGNOSIS — M54.50 CHRONIC MIDLINE LOW BACK PAIN WITHOUT SCIATICA: Primary | ICD-10-CM

## 2023-09-22 DIAGNOSIS — G89.29 CHRONIC MIDLINE LOW BACK PAIN WITHOUT SCIATICA: Primary | ICD-10-CM

## 2023-09-22 DIAGNOSIS — M51.36 DDD (DEGENERATIVE DISC DISEASE), LUMBAR: ICD-10-CM

## 2023-09-22 DIAGNOSIS — Z02.89 ENCOUNTER FOR COMPLETION OF FORM WITH PATIENT: ICD-10-CM

## 2023-09-22 DIAGNOSIS — R25.2 LEG CRAMPING: ICD-10-CM

## 2023-09-22 PROBLEM — N81.89 PELVIC RELAXATION: Status: ACTIVE | Noted: 2023-09-22

## 2023-09-22 PROCEDURE — 1160F RVW MEDS BY RX/DR IN RCRD: CPT | Performed by: FAMILY MEDICINE

## 2023-09-22 PROCEDURE — 3008F BODY MASS INDEX DOCD: CPT | Performed by: FAMILY MEDICINE

## 2023-09-22 PROCEDURE — 99213 OFFICE O/P EST LOW 20 MIN: CPT | Performed by: FAMILY MEDICINE

## 2023-09-22 PROCEDURE — 3078F DIAST BP <80 MM HG: CPT | Performed by: FAMILY MEDICINE

## 2023-09-22 PROCEDURE — 3075F SYST BP GE 130 - 139MM HG: CPT | Performed by: FAMILY MEDICINE

## 2023-09-22 PROCEDURE — 1159F MED LIST DOCD IN RCRD: CPT | Performed by: FAMILY MEDICINE

## 2023-09-22 NOTE — TELEPHONE ENCOUNTER
Dr. Alycia Ramirez completed [facility]. The form was faxed to [facility]'s number:  [phone] and th original form was placed in the folder @ check-in for pt's son to .

## 2023-09-25 ENCOUNTER — MED REC SCAN ONLY (OUTPATIENT)
Dept: FAMILY MEDICINE CLINIC | Facility: CLINIC | Age: 78
End: 2023-09-25

## 2023-09-26 ENCOUNTER — OFFICE VISIT (OUTPATIENT)
Dept: FAMILY MEDICINE CLINIC | Facility: CLINIC | Age: 78
End: 2023-09-26
Payer: COMMERCIAL

## 2023-09-26 ENCOUNTER — PATIENT MESSAGE (OUTPATIENT)
Dept: FAMILY MEDICINE CLINIC | Facility: CLINIC | Age: 78
End: 2023-09-26

## 2023-09-26 VITALS
SYSTOLIC BLOOD PRESSURE: 124 MMHG | HEART RATE: 68 BPM | BODY MASS INDEX: 21.86 KG/M2 | RESPIRATION RATE: 16 BRPM | WEIGHT: 104.13 LBS | DIASTOLIC BLOOD PRESSURE: 60 MMHG | TEMPERATURE: 97 F | HEIGHT: 57.72 IN | OXYGEN SATURATION: 98 %

## 2023-09-26 DIAGNOSIS — I10 ESSENTIAL (PRIMARY) HYPERTENSION: ICD-10-CM

## 2023-09-26 DIAGNOSIS — Z13.6 SCREENING FOR CARDIOVASCULAR CONDITION: ICD-10-CM

## 2023-09-26 DIAGNOSIS — G89.29 CHRONIC MIDLINE LOW BACK PAIN WITHOUT SCIATICA: ICD-10-CM

## 2023-09-26 DIAGNOSIS — Z78.0 POSTMENOPAUSAL: ICD-10-CM

## 2023-09-26 DIAGNOSIS — M54.50 CHRONIC MIDLINE LOW BACK PAIN WITHOUT SCIATICA: ICD-10-CM

## 2023-09-26 DIAGNOSIS — I10 ESSENTIAL HYPERTENSION, BENIGN: ICD-10-CM

## 2023-09-26 DIAGNOSIS — Z00.00 ENCOUNTER FOR ANNUAL HEALTH EXAMINATION: Primary | ICD-10-CM

## 2023-09-26 DIAGNOSIS — D50.8 IRON DEFICIENCY ANEMIA SECONDARY TO INADEQUATE DIETARY IRON INTAKE: ICD-10-CM

## 2023-09-26 DIAGNOSIS — N81.11 MIDLINE CYSTOCELE: ICD-10-CM

## 2023-09-26 DIAGNOSIS — N95.2 POSTMENOPAUSAL ATROPHIC VAGINITIS: ICD-10-CM

## 2023-09-26 DIAGNOSIS — E03.9 HYPOTHYROIDISM, UNSPECIFIED TYPE: ICD-10-CM

## 2023-09-26 DIAGNOSIS — M85.80 OSTEOPENIA, UNSPECIFIED LOCATION: ICD-10-CM

## 2023-09-26 PROBLEM — N81.89 PELVIC RELAXATION: Status: RESOLVED | Noted: 2023-09-22 | Resolved: 2023-09-26

## 2023-09-26 PROBLEM — J41.0 SMOKERS' COUGH (HCC): Chronic | Status: RESOLVED | Noted: 2023-09-26 | Resolved: 2023-09-26

## 2023-09-26 PROBLEM — J41.0 SMOKERS' COUGH (HCC): Chronic | Status: ACTIVE | Noted: 2023-09-26

## 2023-09-26 PROCEDURE — 3078F DIAST BP <80 MM HG: CPT | Performed by: FAMILY MEDICINE

## 2023-09-26 PROCEDURE — 99213 OFFICE O/P EST LOW 20 MIN: CPT | Performed by: FAMILY MEDICINE

## 2023-09-26 PROCEDURE — 3074F SYST BP LT 130 MM HG: CPT | Performed by: FAMILY MEDICINE

## 2023-09-26 PROCEDURE — G0439 PPPS, SUBSEQ VISIT: HCPCS | Performed by: FAMILY MEDICINE

## 2023-09-26 PROCEDURE — 1159F MED LIST DOCD IN RCRD: CPT | Performed by: FAMILY MEDICINE

## 2023-09-26 PROCEDURE — 1170F FXNL STATUS ASSESSED: CPT | Performed by: FAMILY MEDICINE

## 2023-09-26 PROCEDURE — 1125F AMNT PAIN NOTED PAIN PRSNT: CPT | Performed by: FAMILY MEDICINE

## 2023-09-26 PROCEDURE — 96160 PT-FOCUSED HLTH RISK ASSMT: CPT | Performed by: FAMILY MEDICINE

## 2023-09-26 PROCEDURE — 1160F RVW MEDS BY RX/DR IN RCRD: CPT | Performed by: FAMILY MEDICINE

## 2023-09-26 PROCEDURE — 3008F BODY MASS INDEX DOCD: CPT | Performed by: FAMILY MEDICINE

## 2023-09-26 RX ORDER — LEVOTHYROXINE SODIUM 0.03 MG/1
25 TABLET ORAL EVERY OTHER DAY
Qty: 45 TABLET | Refills: 1 | Status: SHIPPED | OUTPATIENT
Start: 2023-09-26

## 2023-09-26 RX ORDER — AMLODIPINE BESYLATE 5 MG/1
5 TABLET ORAL DAILY
Qty: 90 TABLET | Refills: 0 | Status: SHIPPED | OUTPATIENT
Start: 2023-09-26

## 2023-09-27 NOTE — TELEPHONE ENCOUNTER
From: Sam Dela Cruz  To: Miriam Olivares  Sent: 9/26/2023 9:23 PM CDT  Subject: High blood pressure     Hi Dr. Rohan Conde,    I wanted to update you on the blood pressure facts I learnt from my mom after our meeting. Though the blood pressure taken at your office was within normal limits, the record that my mother kept shows blood pressure high wish systolic in 831R and 190T many times. I also took her pressure today at home which was in 132/81. I later realize that whenever she notices her blood pressure goes high she cuts back on eating thinking that it will bring the pressure low. ( this accounts for 6lbs wt loss she had experienced since past few months. I feel that 5mg amlodipine may not be controlling her blood pressure to goal and feel that a higher dose may benefit to keep her pressure in range. I had a long discussion with my mom about this after realizing above and talking to her. Since she will be traveling to [facility] soon, I feel comfortable if her blood pressure is in line with your goal and I will monitor her pressure after dose increase until she leaves to [facility]. Please give your thoughts on increasing norvasc to 10mg daily.  Thanks

## 2023-10-14 ENCOUNTER — PATIENT MESSAGE (OUTPATIENT)
Dept: FAMILY MEDICINE CLINIC | Facility: CLINIC | Age: 78
End: 2023-10-14

## 2023-12-12 DIAGNOSIS — I10 ESSENTIAL (PRIMARY) HYPERTENSION: ICD-10-CM

## 2023-12-12 NOTE — TELEPHONE ENCOUNTER
LOV 9/26/2023     LAST LAB 11/09/2022    LAST RX   Medication Quantity Refills Start End   amLODIPine 5 MG Oral Tab 90 tablet 0 9/26/2023    Sig:   Take 1 tablet (5 mg total) by mouth daily. Next OV No future appointments.      PROTOCOL     Hypertension Medications Protocol [id number]/12/2023 12:30 AM   Protocol Details CMP or BMP in past 12 months    Last serum creatinine< 2.0    Appointment in past 6 or next 3 months

## 2023-12-13 RX ORDER — AMLODIPINE BESYLATE 5 MG/1
5 TABLET ORAL DAILY
Qty: 90 TABLET | Refills: 0 | Status: SHIPPED | OUTPATIENT
Start: 2023-12-13

## 2023-12-20 DIAGNOSIS — M85.80 OSTEOPENIA, UNSPECIFIED LOCATION: ICD-10-CM

## 2023-12-20 RX ORDER — ALENDRONATE SODIUM 70 MG/1
70 TABLET ORAL WEEKLY
Qty: 12 TABLET | Refills: 3 | OUTPATIENT
Start: 2023-12-20

## 2023-12-20 NOTE — TELEPHONE ENCOUNTER
Osteoporosis Medication Protocol [id number]/20/2023 05:30 AM   Protocol Details DEXA scan within past 2 years    CMP within the past 12 months    Calcium level between 8.3 and 10.3    GFR level greater than 35    Appointment within past 12 or next 3 months        LOV 9/22/23     LAST LAB  11/9/22     LAST RX  3/1/23 12 with 3     Next OV No future appointments.        PROTOCOL failed       Refill to soon

## 2024-02-28 DIAGNOSIS — I10 ESSENTIAL (PRIMARY) HYPERTENSION: ICD-10-CM

## 2024-03-12 ENCOUNTER — OFFICE VISIT (OUTPATIENT)
Dept: FAMILY MEDICINE CLINIC | Facility: CLINIC | Age: 79
End: 2024-03-12
Payer: COMMERCIAL

## 2024-03-12 ENCOUNTER — LAB ENCOUNTER (OUTPATIENT)
Dept: LAB | Age: 79
End: 2024-03-12
Attending: FAMILY MEDICINE
Payer: MEDICARE

## 2024-03-12 ENCOUNTER — HOSPITAL ENCOUNTER (OUTPATIENT)
Dept: GENERAL RADIOLOGY | Age: 79
Discharge: HOME | End: 2024-03-12
Attending: FAMILY MEDICINE
Payer: MEDICARE

## 2024-03-12 VITALS
HEART RATE: 82 BPM | TEMPERATURE: 98 F | RESPIRATION RATE: 18 BRPM | SYSTOLIC BLOOD PRESSURE: 110 MMHG | OXYGEN SATURATION: 98 % | HEIGHT: 57.72 IN | WEIGHT: 111 LBS | DIASTOLIC BLOOD PRESSURE: 68 MMHG | BODY MASS INDEX: 23.3 KG/M2

## 2024-03-12 DIAGNOSIS — Z78.0 POSTMENOPAUSAL: ICD-10-CM

## 2024-03-12 DIAGNOSIS — I10 ESSENTIAL HYPERTENSION, BENIGN: ICD-10-CM

## 2024-03-12 DIAGNOSIS — Z13.6 SCREENING FOR CARDIOVASCULAR CONDITION: ICD-10-CM

## 2024-03-12 DIAGNOSIS — Z00.00 ENCOUNTER FOR ANNUAL HEALTH EXAMINATION: Primary | ICD-10-CM

## 2024-03-12 DIAGNOSIS — M85.80 OSTEOPENIA, UNSPECIFIED LOCATION: ICD-10-CM

## 2024-03-12 DIAGNOSIS — M25.511 ACUTE PAIN OF RIGHT SHOULDER: ICD-10-CM

## 2024-03-12 DIAGNOSIS — H91.93 HEARING DIFFICULTY OF BOTH EARS: ICD-10-CM

## 2024-03-12 DIAGNOSIS — G89.29 CHRONIC MIDLINE LOW BACK PAIN WITHOUT SCIATICA: ICD-10-CM

## 2024-03-12 DIAGNOSIS — I10 ESSENTIAL (PRIMARY) HYPERTENSION: ICD-10-CM

## 2024-03-12 DIAGNOSIS — M54.50 CHRONIC MIDLINE LOW BACK PAIN WITHOUT SCIATICA: ICD-10-CM

## 2024-03-12 DIAGNOSIS — D50.8 IRON DEFICIENCY ANEMIA SECONDARY TO INADEQUATE DIETARY IRON INTAKE: ICD-10-CM

## 2024-03-12 DIAGNOSIS — E03.9 HYPOTHYROIDISM, UNSPECIFIED TYPE: ICD-10-CM

## 2024-03-12 DIAGNOSIS — N81.10 FEMALE BLADDER PROLAPSE: ICD-10-CM

## 2024-03-12 DIAGNOSIS — N95.2 POSTMENOPAUSAL ATROPHIC VAGINITIS: ICD-10-CM

## 2024-03-12 LAB
ALBUMIN SERPL-MCNC: 3.7 G/DL (ref 3.4–5)
ALBUMIN/GLOB SERPL: 0.9 {RATIO} (ref 1–2)
ALP LIVER SERPL-CCNC: 65 U/L
ALT SERPL-CCNC: 33 U/L
ANION GAP SERPL CALC-SCNC: 5 MMOL/L (ref 0–18)
AST SERPL-CCNC: 35 U/L (ref 15–37)
BASOPHILS # BLD AUTO: 0.08 X10(3) UL (ref 0–0.2)
BASOPHILS NFR BLD AUTO: 1 %
BILIRUB SERPL-MCNC: 0.2 MG/DL (ref 0.1–2)
BUN BLD-MCNC: 11 MG/DL (ref 9–23)
CALCIUM BLD-MCNC: 9.6 MG/DL (ref 8.5–10.1)
CHLORIDE SERPL-SCNC: 104 MMOL/L (ref 98–112)
CHOLEST SERPL-MCNC: 185 MG/DL (ref ?–200)
CO2 SERPL-SCNC: 29 MMOL/L (ref 21–32)
CREAT BLD-MCNC: 1.19 MG/DL
DEPRECATED HBV CORE AB SER IA-ACNC: 23.6 NG/ML
EGFRCR SERPLBLD CKD-EPI 2021: 47 ML/MIN/1.73M2 (ref 60–?)
EOSINOPHIL # BLD AUTO: 0.14 X10(3) UL (ref 0–0.7)
EOSINOPHIL NFR BLD AUTO: 1.8 %
ERYTHROCYTE [DISTWIDTH] IN BLOOD BY AUTOMATED COUNT: 14.2 %
FASTING PATIENT LIPID ANSWER: NO
FASTING STATUS PATIENT QL REPORTED: NO
GLOBULIN PLAS-MCNC: 4.3 G/DL (ref 2.8–4.4)
GLUCOSE BLD-MCNC: 93 MG/DL (ref 70–99)
HCT VFR BLD AUTO: 37.4 %
HDLC SERPL-MCNC: 62 MG/DL (ref 40–59)
HGB BLD-MCNC: 11.8 G/DL
IMM GRANULOCYTES # BLD AUTO: 0.02 X10(3) UL (ref 0–1)
IMM GRANULOCYTES NFR BLD: 0.3 %
IRON SATN MFR SERPL: 12 %
IRON SERPL-MCNC: 53 UG/DL
LDLC SERPL CALC-MCNC: 93 MG/DL (ref ?–100)
LYMPHOCYTES # BLD AUTO: 2.66 X10(3) UL (ref 1–4)
LYMPHOCYTES NFR BLD AUTO: 34.4 %
MCH RBC QN AUTO: 25.4 PG (ref 26–34)
MCHC RBC AUTO-ENTMCNC: 31.6 G/DL (ref 31–37)
MCV RBC AUTO: 80.4 FL
MONOCYTES # BLD AUTO: 0.58 X10(3) UL (ref 0.1–1)
MONOCYTES NFR BLD AUTO: 7.5 %
NEUTROPHILS # BLD AUTO: 4.26 X10 (3) UL (ref 1.5–7.7)
NEUTROPHILS # BLD AUTO: 4.26 X10(3) UL (ref 1.5–7.7)
NEUTROPHILS NFR BLD AUTO: 55 %
NONHDLC SERPL-MCNC: 123 MG/DL (ref ?–130)
OSMOLALITY SERPL CALC.SUM OF ELEC: 285 MOSM/KG (ref 275–295)
PLATELET # BLD AUTO: 288 10(3)UL (ref 150–450)
POTASSIUM SERPL-SCNC: 4.4 MMOL/L (ref 3.5–5.1)
PROT SERPL-MCNC: 8 G/DL (ref 6.4–8.2)
RBC # BLD AUTO: 4.65 X10(6)UL
SODIUM SERPL-SCNC: 138 MMOL/L (ref 136–145)
T4 FREE SERPL-MCNC: 1.1 NG/DL (ref 0.8–1.7)
TIBC SERPL-MCNC: 447 UG/DL (ref 240–450)
TRANSFERRIN SERPL-MCNC: 300 MG/DL (ref 200–360)
TRIGL SERPL-MCNC: 174 MG/DL (ref 30–149)
TSI SER-ACNC: 1.74 MIU/ML (ref 0.36–3.74)
VLDLC SERPL CALC-MCNC: 28 MG/DL (ref 0–30)
WBC # BLD AUTO: 7.7 X10(3) UL (ref 4–11)

## 2024-03-12 PROCEDURE — 83540 ASSAY OF IRON: CPT

## 2024-03-12 PROCEDURE — 84443 ASSAY THYROID STIM HORMONE: CPT

## 2024-03-12 PROCEDURE — 83550 IRON BINDING TEST: CPT

## 2024-03-12 PROCEDURE — 80053 COMPREHEN METABOLIC PANEL: CPT

## 2024-03-12 PROCEDURE — 85025 COMPLETE CBC W/AUTO DIFF WBC: CPT

## 2024-03-12 PROCEDURE — 80061 LIPID PANEL: CPT

## 2024-03-12 PROCEDURE — 36415 COLL VENOUS BLD VENIPUNCTURE: CPT

## 2024-03-12 PROCEDURE — 73030 X-RAY EXAM OF SHOULDER: CPT | Performed by: FAMILY MEDICINE

## 2024-03-12 PROCEDURE — 82728 ASSAY OF FERRITIN: CPT

## 2024-03-12 PROCEDURE — 84439 ASSAY OF FREE THYROXINE: CPT

## 2024-03-12 RX ORDER — ALENDRONATE SODIUM 70 MG/1
70 TABLET ORAL WEEKLY
Qty: 12 TABLET | Refills: 3 | Status: SHIPPED | OUTPATIENT
Start: 2024-03-12

## 2024-03-12 RX ORDER — AMLODIPINE BESYLATE 5 MG/1
5 TABLET ORAL DAILY
Qty: 90 TABLET | Refills: 1 | Status: SHIPPED | OUTPATIENT
Start: 2024-03-12

## 2024-03-12 RX ORDER — LEVOTHYROXINE SODIUM 0.03 MG/1
25 TABLET ORAL EVERY OTHER DAY
Qty: 45 TABLET | Refills: 3 | Status: SHIPPED | OUTPATIENT
Start: 2024-03-12

## 2024-03-12 RX ORDER — LEVOTHYROXINE SODIUM 0.05 MG/1
50 TABLET ORAL EVERY OTHER DAY
Qty: 135 TABLET | Refills: 3 | Status: SHIPPED | OUTPATIENT
Start: 2024-03-12

## 2024-03-12 RX ORDER — MELOXICAM 7.5 MG/1
7.5 TABLET ORAL DAILY
Qty: 30 TABLET | Refills: 2 | Status: SHIPPED | OUTPATIENT
Start: 2024-03-12

## 2024-03-12 NOTE — PROGRESS NOTES
Subjective:   Baron Wylie is a 78 year old female who presents for a MA (Medicare Advantage) Supervisit (Once per calendar year) and scheduled follow up of multiple significant but stable problems.     Has uterine prolapse in [place] and was recommended surgery, states will be going to see her daughter in [place] end of this month and will see a reconstructive surgeon [place] over there for surgery.    Chronic back pain.    Right shoulder pain for 3-4 months. States had it mild initially and then has progressively gotten worse. Has not been taking any medication.    Compliant with thyroid medication and tolerating it well.    Compliant with blood pressure medication and ;ow salt diet. Tolerating medication well without any side effects.    History/Other:   Fall Risk Assessment:   She has been screened for Falls and is High Risk. Fall Prevention information provided to patient in After Visit Summary.    Do you feel unsteady when standing or walking?: Yes  Do you worry about falling?: Yes  Have you fallen in the past year?: No     Cognitive Assessment:   She had a completely normal cognitive assessment - see flowsheet entries     Functional Ability/Status:   Baron Wylie has some abnormal functions as listed below:  She has Dressing and/or Bathing issues based on screening of functional status.  Difficulty dressing or bathing?: Yes  Bathing or Showering: Able without help  Dressing: Able without help  She has Driving difficulties based on screening of functional status. She has Meal Preparation difficulties based on screening of functional status.She has difficulties Managing Money/Bills based on screening of functional status.She has difficulties Shopping for Groceries based on screening of functional status. She has difficulties Affording Meds based on screening of functional status. She has Hearing problems based on screening of functional status.She has Walking problems based on screening of functional  status.       Depression Screening (PHQ-2/PHQ-9): PHQ-2 SCORE: 0  , done 3/12/2024             Advanced Directives:   She does NOT have a Living Will. [Do you have a living will?: No]  She has a Power of  for Health Care on file in [facility].  Discussed Advance Care Planning with patient (and family/surrogate if present). Standard forms made available to patient in After Visit Summary.      Patient Active Problem List   Diagnosis    Osteopenia    Hypothyroidism    Hearing difficulty of both ears    Iron deficiency anemia    Female bladder prolapse    Essential hypertension, benign    Chronic midline low back pain without sciatica    Blurred vision, right eye    Postmenopausal atrophic vaginitis    Acute pain of right shoulder    Pelvic floor relaxation    Midline cystocele     Allergies:  She is allergic to penicillins.    Current Medications:  Outpatient Medications Marked as Taking for the 3/12/24 encounter (Office Visit) with Miriam Olivares MD   Medication Sig    amLODIPine 5 MG Oral Tab Take 1 tablet (5 mg total) by mouth daily.    levothyroxine 25 MCG Oral Tab Take 1 tablet (25 mcg total) by mouth every other day. BEFORE A MEAL    levothyroxine 50 MCG Oral Tab Take 1 tablet (50 mcg total) by mouth every other day. BEFORE A MEAL    alendronate 70 MG Oral Tab Take 1 tablet (70 mg total) by mouth once a week.    Meloxicam 7.5 MG Oral Tab Take 1 tablet (7.5 mg total) by mouth daily.    Calcium Citrate 950 (200 Ca) MG Oral Tab     Estradiol 0.1 MG/GM Vaginal Cream Place 0.5 g vaginally 3 (three) times a week.    Vitamin D3 (VITAMIN D3) 1000 UNITS Oral Tab Take 1 tablet (1,000 Units total) by mouth daily.    MULTIVITAMINS OR TABS 1 TABLET DAILY       Medical History:  She  has a past medical history of Esophageal reflux, GERD, HYPOTHYROIDISM, MENOPAUSE (1993), and OSTEOPENIA.  Surgical History:  She  has a past surgical history that includes other surgical history; other surgical history; and cataract  (Bilateral, 2010).   Family History:  Her family history includes Cancer in her father and another family member; Diabetes in her father, mother, paternal aunt, paternal grandfather, paternal grandmother, and son; Heart Disease in her mother; Heart Disorder in her mother; Hypertension in her mother.  Social History:  She  reports that she has never smoked. She has never been exposed to tobacco smoke. She has never used smokeless tobacco. She reports that she does not drink alcohol and does not use drugs.    Tobacco:  She has never smoked tobacco.    CAGE Alcohol Screen:   CAGE screening score of 0 on 3/12/2024, showing low risk of alcohol abuse.      Patient Care Team:  Miriam Olivares MD as PCP - General (Family Medicine)  Edna Traore PT as Physical Therapist  Marilu Gandhi MD as Consulting Physician (GASTROENTEROLOGY)  Gillian Harding, PT as Physical Therapist    Review of Systems   Constitutional:  Negative for appetite change, fatigue, fever and unexpected weight change.   HENT:  Negative for congestion, ear pain, hearing loss and sore throat.    Eyes:  Negative for discharge, redness and visual disturbance.   Respiratory:  Negative for cough, chest tightness and shortness of breath.    Cardiovascular:  Negative for chest pain and palpitations.   Gastrointestinal:  Negative for abdominal pain, blood in stool, constipation and nausea.   Endocrine: Negative for cold intolerance, heat intolerance and polyuria.   Genitourinary:  Negative for difficulty urinating, dysuria, frequency and urgency.   Musculoskeletal:  Positive for arthralgias and back pain. Negative for gait problem, joint swelling and myalgias.   Skin:  Negative for rash.   Allergic/Immunologic: Negative for food allergies.   Neurological:  Negative for dizziness, weakness, numbness and headaches.   Hematological:  Negative for adenopathy. Does not bruise/bleed easily.   Psychiatric/Behavioral:  Negative for dysphoric mood and sleep  disturbance. The patient is not nervous/anxious.           Objective:   Physical Exam  Constitutional:       General: She is not in acute distress.     Appearance: Normal appearance. She is well-developed and normal weight.   HENT:      Head: Normocephalic and atraumatic.      Right Ear: Tympanic membrane and external ear normal.      Left Ear: Tympanic membrane and external ear normal.      Nose: Nose normal.      Mouth/Throat:      Mouth: Mucous membranes are moist.      Pharynx: Oropharynx is clear.   Eyes:      Extraocular Movements: Extraocular movements intact.      Conjunctiva/sclera: Conjunctivae normal.      Pupils: Pupils are equal, round, and reactive to light.   Neck:      Thyroid: No thyromegaly.   Cardiovascular:      Rate and Rhythm: Normal rate and regular rhythm.      Pulses: Normal pulses.      Heart sounds: Normal heart sounds. No murmur heard.  Pulmonary:      Effort: Pulmonary effort is normal. No respiratory distress.      Breath sounds: Normal breath sounds.   Chest:      Chest wall: No tenderness.   Abdominal:      General: Bowel sounds are normal. There is no distension.      Palpations: Abdomen is soft. There is no hepatomegaly, splenomegaly or mass.      Tenderness: There is no abdominal tenderness.      Hernia: No hernia is present.   Musculoskeletal:      Right shoulder: Tenderness present. No swelling. Decreased range of motion.      Cervical back: Normal range of motion and neck supple.      Right lower leg: No edema.      Left lower leg: No edema.   Lymphadenopathy:      Cervical: No cervical adenopathy.   Skin:     General: Skin is warm.      Findings: No rash.   Neurological:      General: No focal deficit present.      Mental Status: She is alert and oriented to person, place, and time.      Deep Tendon Reflexes: Reflexes are normal and symmetric.   Psychiatric:         Mood and Affect: Mood normal.         Behavior: Behavior normal.         Thought Content: Thought content normal.          Judgment: Judgment normal.       /68   Pulse 82   Temp 98 °F (36.7 °C) (Temporal)   Resp 18   Ht 4' 9.72\" (1.466 m)   Wt 111 lb (50.3 kg)   SpO2 98%   BMI 23.42 kg/m²  Estimated body mass index is 23.42 kg/m² as calculated from the following:    Height as of this encounter: 4' 9.72\" (1.466 m).    Weight as of this encounter: 111 lb (50.3 kg).    Medicare Hearing Assessment:   Hearing Screening    Screening Method: Whisper Test  Whisper Test Result: Pass         Visual Acuity:   Right Eye Visual Acuity: Uncorrected Right Eye Chart Acuity: 20/30   Left Eye Visual Acuity: Uncorrected Left Eye Chart Acuity: 20/30   Both Eyes Visual Acuity: Uncorrected Both Eyes Chart Acuity: 20/30   Able To Tolerate Visual Acuity: Yes        Assessment & Plan:   Baron Wylie is a 78 year old female who presents for a Medicare Assessment.   Baron was seen today for physical.    Diagnoses and all orders for this visit:    Encounter for annual health examination    Essential (primary) hypertension controlled  -     Expanded, Low Complexity (41061)  -     amLODIPine 5 MG Oral Tab; Take 1 tablet (5 mg total) by mouth daily.  -     continue the same dose of medication  -    limit salt to less than 1000mg daily  -    Goal BP less than 130/80    Hypothyroidism, unspecified type controlled  -     Expanded, Low Complexity (15363)  -     levothyroxine 25 MCG Oral Tab; Take 1 tablet (25 mcg total) by mouth every other day. BEFORE A MEAL  -     levothyroxine 50 MCG Oral Tab; Take 1 tablet (50 mcg total) by mouth every other day. BEFORE A MEAL  -    continue the same dose of medication    Osteopenia, unspecified location stable  -     alendronate 70 MG Oral Tab; Take 1 tablet (70 mg total) by mouth once a week.  -    repeat DEXA    Acute pain of right shoulder  -     XR SHOULDER, COMPLETE (MIN 2 VIEWS), RIGHT (CPT=73030); Future  -     Meloxicam 7.5 MG Oral Tab; Take 1 tablet (7.5 mg total) by mouth daily.    Hearing  difficulty of both ears      - encouraged to continue to use hearing aids    Iron deficiency anemia secondary to inadequate dietary iron intake stable  - encouraged iron rich foods in diet    Female bladder prolapse, symptoms poorly controlled      -  continue estrogen      - f/u with surgeon    Chronic midline low back pain without sciatica      - encouraged PT    Postmenopausal atrophic vaginitis    - continue estrogen    Postmenopausal  -     XR DEXA BONE DENSITOMETRY (CPT=77080); Future          The patient indicates understanding of these issues and agrees to the plan.  Reinforced healthy diet, lifestyle, and exercise.      Return in 6 months (on 9/12/2024).     Miriam Olivares MD, 3/12/2024     Supplementary Documentation:   General Health:  In the past six months, have you lost more than 10 pounds without trying?: 2 - No  Has your appetite been poor?: No  Type of Diet: Vegetarian  How does the patient maintain a good energy level?: Appropriate Exercise  How would you describe your daily physical activity?: Moderate  How would you describe your current health state?: Good  How do you maintain positive mental well-being?: Social Interaction  On a scale of 0 to 10, with 0 being no pain and 10 being severe pain, what is your pain level?: 7 - (Severe)  In the past six months, have you experienced urine leakage?: 0-No  At any time do you feel concerned for the safety/well-being of yourself and/or your children, in your home or elsewhere?: No  Have you had any immunizations at another office such as Influenza, Hepatitis B, Tetanus, or Pneumococcal?: No         Baron Wylie's SCREENING SCHEDULE   Tests on this list are recommended by your physician but may not be covered, or covered at this frequency, by your insurer.   Please check with your insurance carrier before scheduling to verify coverage.   PREVENTATIVE SERVICES FREQUENCY &  COVERAGE DETAILS LAST COMPLETION DATE   Diabetes Screening    Fasting Blood  Sugar /  Glucose    One screening every 12 months if never tested or if previously tested but not diagnosed with pre-diabetes   One screening every 6 months if diagnosed with pre-diabetes Lab Results   Component Value Date    GLUCOSE 87 06/27/2009    GLU 93 03/12/2024        Cardiovascular Disease Screening    Lipid Panel  Cholesterol  Lipoprotein (HDL)  Triglycerides Covered every 5 years for all Medicare beneficiaries without apparent signs or symptoms of cardiovascular disease Lab Results   Component Value Date    CHOLEST 185 03/12/2024    HDL 62 (H) 03/12/2024    LDL 93 03/12/2024    TRIG 174 (H) 03/12/2024         Electrocardiogram (EKG)   Covered if needed at Welcome to Medicare, and non-screening if indicated for medical reasons 09/09/2016      Ultrasound Screening for Abdominal Aortic Aneurysm (AAA) Covered once in a lifetime for one of the following risk factors    Men who are 65-75 years old and have ever smoked    Anyone with a family history -     Colorectal Cancer Screening  Covered for ages 50-85; only need ONE of the following:    Colonoscopy   Covered every 10 years    Covered every 2 years if patient is at high risk or previous colonoscopy was abnormal 01/09/2019    No recommendations at this time    Flexible Sigmoidoscopy   Covered every 4 years -    Fecal Occult Blood Test Covered annually -   Bone Density Screening    Bone density screening    Covered every 2 years after age 65 if diagnosed with risk of osteoporosis or estrogen deficiency.    Covered yearly for long-term glucocorticoid medication use (Steroids) Last Dexa Scan:    XR DEXA BONE DENSITOMETRY (CPT=77080) 07/08/2021      No recommendations at this time   Pap and Pelvic    Pap   Covered every 2 years for women at normal risk; Annually if at high risk -  No recommendations at this time    Chlamydia Annually if high risk -  No recommendations at this time   Screening Mammogram    Mammogram     Recommend annually for all female patients  aged 40 and older    One baseline mammogram covered for patients aged 35-39 -    No recommendations at this time    Immunizations    Influenza Covered once per flu season  Please get every year -  No recommendations at this time    Pneumococcal Each vaccine (Mnsjlwz34 & Ydhomvuww44) covered once after 65 Prevnar 13: 10/15/2015    Kycgqsjeu99: 12/05/2012     No recommendations at this time    Hepatitis B One screening covered for patients with certain risk factors   -  No recommendations at this time    Tetanus Toxoid Not covered by Medicare Part B unless medically necessary (cut with metal); may be covered with your pharmacy prescription benefits 06/24/2009    Tetanus, Diptheria and Pertusis TD and TDaP Not covered by Medicare Part B -  No recommendations at this time    Zoster Not covered by Medicare Part B; may be covered with your pharmacy  prescription benefits 02/25/2016  Zoster Vaccines(2 of 3) due on 04/21/2016

## 2024-03-12 NOTE — PATIENT INSTRUCTIONS
Baron Wylie's SCREENING SCHEDULE   Tests on this list are recommended by your physician but may not be covered, or covered at this frequency, by your insurer.   Please check with your insurance carrier before scheduling to verify coverage.   PREVENTATIVE SERVICES FREQUENCY &  COVERAGE DETAILS LAST COMPLETION DATE   Diabetes Screening    Fasting Blood Sugar /  Glucose    One screening every 12 months if never tested or if previously tested but not diagnosed with pre-diabetes   One screening every 6 months if diagnosed with pre-diabetes Lab Results   Component Value Date    GLUCOSE 87 06/27/2009    GLU 86 11/09/2022        Cardiovascular Disease Screening    Lipid Panel  Cholesterol  Lipoprotein (HDL)  Triglycerides Covered every 5 years for all Medicare beneficiaries without apparent signs or symptoms of cardiovascular disease Lab Results   Component Value Date    CHOLEST 196 11/09/2022    HDL 74 (H) 11/09/2022     (H) 11/09/2022    TRIG 86 11/09/2022         Electrocardiogram (EKG)   Covered if needed at Welcome to Medicare, and non-screening if indicated for medical reasons 09/09/2016      Ultrasound Screening for Abdominal Aortic Aneurysm (AAA) Covered once in a lifetime for one of the following risk factors   • Men who are 65-75 years old and have ever smoked   • Anyone with a family history -     Colorectal Cancer Screening  Covered for ages 50-85; only need ONE of the following:    Colonoscopy   Covered every 10 years    Covered every 2 years if patient is at high risk or previous colonoscopy was abnormal 01/09/2019    No recommendations at this time    Flexible Sigmoidoscopy   Covered every 4 years -    Fecal Occult Blood Test Covered annually -   Bone Density Screening    Bone density screening    Covered every 2 years after age 65 if diagnosed with risk of osteoporosis or estrogen deficiency.    Covered yearly for long-term glucocorticoid medication use (Steroids) Last Dexa Scan:    XR DEXA  BONE DENSITOMETRY (CPT=77080) 07/08/2021      No recommendations at this time   Pap and Pelvic    Pap   Covered every 2 years for women at normal risk; Annually if at high risk -  No recommendations at this time    Chlamydia Annually if high risk -  No recommendations at this time   Screening Mammogram    Mammogram     Recommend annually for all female patients aged 40 and older    One baseline mammogram covered for patients aged 35-39 -    No recommendations at this time    Immunizations    Influenza Covered once per flu season  Please get every year -  Influenza Vaccine(1) due on 10/01/2023    Pneumococcal Each vaccine ([id number] & Sunfezoxu19) covered once after 65 Prevnar 13: 10/15/2015    [id number]: 12/05/2012     No recommendations at this time    Hepatitis B One screening covered for patients with certain risk factors   -  No recommendations at this time    Tetanus Toxoid Not covered by Medicare Part B unless medically necessary (cut with metal); may be covered with your pharmacy prescription benefits 06/24/2009    Tetanus, Diptheria and Pertusis TD and TDaP Not covered by Medicare Part B -  No recommendations at this time    Zoster Not covered by Medicare Part B; may be covered with your pharmacy  prescription benefits 02/25/2016  Zoster Vaccines(2 of 3) due on 04/21/2016

## 2024-03-13 RX ORDER — AMLODIPINE BESYLATE 5 MG/1
5 TABLET ORAL DAILY
Qty: 90 TABLET | Refills: 0 | OUTPATIENT
Start: 2024-03-13

## 2024-04-01 ENCOUNTER — PATIENT MESSAGE (OUTPATIENT)
Dept: FAMILY MEDICINE CLINIC | Facility: CLINIC | Age: 79
End: 2024-04-01

## 2024-04-04 NOTE — TELEPHONE ENCOUNTER
Please advise    From: Baron Wylie  To: Miriam Olivares  Sent: 4/1/2024  7:45 PM CDT  Subject: Fosamax    Hello , I am typing on behalf of Baron Wylie. She had been taking fosamax once a week for more than 7years now. All available litterateur points out no advantage of taking this greater than 5 years. I wanted to get your opinion if she should continue taking this, given the fact of more data on TMJ and other bone remodeling effects on long term use. Please give your opinion.  Thank you     Shamar

## 2024-04-30 ENCOUNTER — PATIENT MESSAGE (OUTPATIENT)
Dept: FAMILY MEDICINE CLINIC | Facility: CLINIC | Age: 79
End: 2024-04-30

## 2024-05-01 NOTE — TELEPHONE ENCOUNTER
From: Baron Wylie  To: Miriam Olivares  Sent: 4/30/2024 4:39 PM CDT  Subject: Fosamax    Hello Dr Olivares, my mom had been taking fosamax for over 8 years now. She was asked to stop taking it due to the dental implants last year. Given the risk of osteonectosis with no added benefit of long term use in literatures, do you feel that she should co tongue taking it weekly considering risk vs benefit

## 2024-05-01 NOTE — TELEPHONE ENCOUNTER
Per PCP OV notes 3/12/24: Osteopenia, unspecified location stable  -     alendronate 70 MG Oral Tab; Take 1 tablet (70 mg total) by mouth once a week.  -    repeat DEXA    Last refill  alendronate 70 MG Oral Tab 12 tablet 3 3/12/2024 --   Sig:   Take 1 tablet (70 mg total) by mouth once a week.       6 month f/u scheduled:  Future Appointments   Date Time Provider Department Center   9/24/2024 10:20 AM Miriam Olivares MD [facility]

## 2024-09-24 ENCOUNTER — OFFICE VISIT (OUTPATIENT)
Dept: FAMILY MEDICINE CLINIC | Facility: CLINIC | Age: 79
End: 2024-09-24
Payer: COMMERCIAL

## 2024-09-24 VITALS
HEIGHT: 57.72 IN | BODY MASS INDEX: 22.67 KG/M2 | OXYGEN SATURATION: 98 % | RESPIRATION RATE: 18 BRPM | DIASTOLIC BLOOD PRESSURE: 64 MMHG | WEIGHT: 108 LBS | HEART RATE: 78 BPM | TEMPERATURE: 98 F | SYSTOLIC BLOOD PRESSURE: 110 MMHG

## 2024-09-24 DIAGNOSIS — E55.9 VITAMIN D DEFICIENCY: ICD-10-CM

## 2024-09-24 DIAGNOSIS — G89.29 CHRONIC MIDLINE LOW BACK PAIN WITH LEFT-SIDED SCIATICA: ICD-10-CM

## 2024-09-24 DIAGNOSIS — M25.511 ACUTE PAIN OF RIGHT SHOULDER: ICD-10-CM

## 2024-09-24 DIAGNOSIS — D50.8 IRON DEFICIENCY ANEMIA SECONDARY TO INADEQUATE DIETARY IRON INTAKE: ICD-10-CM

## 2024-09-24 DIAGNOSIS — N18.31 STAGE 3A CHRONIC KIDNEY DISEASE (HCC): ICD-10-CM

## 2024-09-24 DIAGNOSIS — I10 ESSENTIAL (PRIMARY) HYPERTENSION: Primary | ICD-10-CM

## 2024-09-24 DIAGNOSIS — Z13.220 SCREENING FOR LIPID DISORDERS: ICD-10-CM

## 2024-09-24 DIAGNOSIS — E03.9 HYPOTHYROIDISM, UNSPECIFIED TYPE: ICD-10-CM

## 2024-09-24 DIAGNOSIS — J44.89 ASTHMA WITH COPD (CHRONIC OBSTRUCTIVE PULMONARY DISEASE) (HCC): ICD-10-CM

## 2024-09-24 DIAGNOSIS — M54.42 CHRONIC MIDLINE LOW BACK PAIN WITH LEFT-SIDED SCIATICA: ICD-10-CM

## 2024-09-24 PROBLEM — N18.30 CKD (CHRONIC KIDNEY DISEASE) STAGE 3, GFR 30-59 ML/MIN (HCC): Chronic | Status: ACTIVE | Noted: 2024-09-24

## 2024-09-24 PROCEDURE — 1160F RVW MEDS BY RX/DR IN RCRD: CPT | Performed by: FAMILY MEDICINE

## 2024-09-24 PROCEDURE — 3074F SYST BP LT 130 MM HG: CPT | Performed by: FAMILY MEDICINE

## 2024-09-24 PROCEDURE — 3008F BODY MASS INDEX DOCD: CPT | Performed by: FAMILY MEDICINE

## 2024-09-24 PROCEDURE — 3078F DIAST BP <80 MM HG: CPT | Performed by: FAMILY MEDICINE

## 2024-09-24 PROCEDURE — 1159F MED LIST DOCD IN RCRD: CPT | Performed by: FAMILY MEDICINE

## 2024-09-24 PROCEDURE — 99214 OFFICE O/P EST MOD 30 MIN: CPT | Performed by: FAMILY MEDICINE

## 2024-09-24 RX ORDER — MELOXICAM 7.5 MG/1
7.5 TABLET ORAL DAILY
Qty: 30 TABLET | Refills: 2 | Status: SHIPPED | OUTPATIENT
Start: 2024-09-24

## 2024-09-24 RX ORDER — AMLODIPINE BESYLATE 5 MG/1
5 TABLET ORAL DAILY
Qty: 90 TABLET | Refills: 1 | Status: SHIPPED | OUTPATIENT
Start: 2024-09-24

## 2024-09-24 RX ORDER — LEVOTHYROXINE SODIUM 25 UG/1
25 TABLET ORAL EVERY OTHER DAY
Qty: 45 TABLET | Refills: 1 | Status: SHIPPED | OUTPATIENT
Start: 2024-09-24

## 2024-09-24 RX ORDER — LEVOTHYROXINE SODIUM 50 UG/1
50 TABLET ORAL EVERY OTHER DAY
Qty: 135 TABLET | Refills: 1 | Status: SHIPPED | OUTPATIENT
Start: 2024-09-24

## 2024-09-24 NOTE — PROGRESS NOTES
Family Medicine Progress Note    ASSESSMENT AND PLAN:  Baron Wylie is a 79 year old female who is here for:     Baron was seen today for medication follow-up.    Diagnoses and all orders for this visit:    Essential (primary) hypertension controlled  -     amLODIPine 5 MG Oral Tab; Take 1 tablet (5 mg total) by mouth daily.  -     Comp Metabolic Panel (14); Future  -     continue the same dose of medication  -     DASH diet, limit salt to  less than 2000 mg a day  -     Goal BP less than 130/80    Hypothyroidism, unspecified type controlled  -     levothyroxine 25 MCG Oral Tab; Take 1 tablet (25 mcg total) by mouth every other day. BEFORE A MEAL  -     levothyroxine 50 MCG Oral Tab; Take 1 tablet (50 mcg total) by mouth every other day. BEFORE A MEAL  -     TSH and Free T4 [E]; Future  -     continue the same dose of medication    Acute pain of right shoulder, pain controlled  -     Meloxicam 7.5 MG Oral Tab; Take 1 tablet (7.5 mg total) by mouth daily.  -     advised to take medication only as needed due to her CKD    Iron deficiency anemia secondary to inadequate dietary iron intake  -     CBC With Differential With Platelet; Future  -     Iron And Tibc [E]; Future  -     Ferritin [E]; Future    Screening for lipid disorders  -     Lipid Panel; Future    Asthma with COPD (chronic obstructive pulmonary disease) (HCC) stable         - not on any medication at this time         - AAP reviewed    Stage 3a chronic kidney disease (HCC) stable        - chronic, asymptomatic        - encouraged good BP control    Chronic midline low back pain with left-sided sciatica        - advisd to follow up with ortho.        - encouraged patient to increase protein in diet    Vitamin D deficiency  -     Vitamin D; Future           The patient indicates understanding of these issues and agrees to the plan.  Follow-Up: The patient is asked to return in Return in about 6 months (around 3/24/2025) for annual, HTN f/u,  Hypothyroidism f/u.  .     Miriam Olivares MD   09/24/24      CC: Medication Follow-Up    HPI:   Baron Wylie is a 79 year old female who presents for Medication Follow-Up  accompanied by her son Freddie.    Saw Dr.Gregory Lynch with [facility] for her back pain, was recommended spine decompression, did get her MRI done, son states has a f/u appointment after MRI scheduled for tomorrow.  Patient has trouble standing and walking due to the pain, uses a cane, states when she is sitting feels well.    Poor dietary intake per son, will only eat bread and Jelly, small amount of patiño and lentils, is always worried that she might gain weight, patient states feels full quickly and if she eats more feels like food comes up into her throat, denies any reflux symptoms, does occasionally take tums.    Compliant with thyroid medication and tolerating it well. Denies any intolerance to heat or cold, dry skin or hair loss, denies constipation.     Compliant with blood pressure medication and low salt diet. Tolerating medication well without any side effects. Denies headache, dizziness, vision changes, chest pain, palpitations, MONTOYA or swelling in her legs.    Patient is s/p LeFort colpocleisis, posterior repair/perineorrhaphy, and cystoscopy   Date of surgery: 5/29/24, done by Tanvir Ann at the [facility]  Urinary symptoms have resolved since her surgery.    ALLERGY:     Allergies   Allergen Reactions    Penicillins OTHER (SEE COMMENTS) and PALPITATIONS     Pt states she has dizziness.     MEDICATIONS:     Current Outpatient Medications   Medication Sig Dispense Refill    amLODIPine 5 MG Oral Tab Take 1 tablet (5 mg total) by mouth daily. 90 tablet 1    levothyroxine 25 MCG Oral Tab Take 1 tablet (25 mcg total) by mouth every other day. BEFORE A MEAL 45 tablet 1    levothyroxine 50 MCG Oral Tab Take 1 tablet (50 mcg total) by mouth every other day. BEFORE A MEAL 135 tablet 1    Meloxicam 7.5 MG  Oral Tab Take 1 tablet (7.5 mg total) by mouth daily. 30 tablet 2    alendronate 70 MG Oral Tab Take 1 tablet (70 mg total) by mouth once a week. 12 tablet 3    Calcium Citrate 950 (200 Ca) MG Oral Tab       Estradiol 0.1 MG/GM Vaginal Cream Place 0.5 g vaginally 3 (three) times a week. 42.5 g 2    Vitamin D3 (VITAMIN D3) 1000 UNITS Oral Tab Take 1 tablet (1,000 Units total) by mouth daily.      MULTIVITAMINS OR TABS 1 TABLET DAILY        Past Medical History:    Esophageal reflux    GERD    HYPOTHYROIDISM    MENOPAUSE    OSTEOPENIA      Social History:  Social History     Socioeconomic History    Marital status:     Number of children: 3   Occupational History    Occupation: Retired food server   Tobacco Use    Smoking status: Never     Passive exposure: Never    Smokeless tobacco: Never   Vaping Use    Vaping status: Never Used   Substance and Sexual Activity    Alcohol use: No    Drug use: No   Other Topics Concern    Caffeine Concern Yes     Comment: coffee and tea    Exercise Yes     Social Determinants of Health     Food Insecurity: No Food Insecurity (6/15/2024)    Received from [facility]    Hunger Vital Sign     Worried About Running Out of Food in the Last Year: Never true     Ran Out of Food in the Last Year: Never true   Transportation Needs: Unknown (6/15/2024)    Received from [facility]    PRAPARE - Transportation     Lack of Transportation (Medical): No        REVIEW OF SYSTEMS:   A comprehensive 10 point review of systems was completed.  Pertinent positives and negatives noted in the the HPI.      EXAM:   /64   Pulse 78   Temp 98 °F (36.7 °C) (Temporal)   Resp 18   Ht 4' 9.72\" (1.466 m)   Wt 108 lb (49 kg)   SpO2 98%   BMI 22.79 kg/m²   GENERAL: well developed, well nourished,in no apparent distress  SKIN: no rashes,no suspicious lesions  HEENT: atraumatic, normocephalic  NECK: supple,no thyromegaly  LUNGS: clear to  auscultation  CARDIO: RRR without murmur  GI: good BS's,no masses, HSM or tenderness  EXTREMITIES: no cyanosis, clubbing or edema  BACK: scoliosis, + tenderness to palpation, SLR positive in the left.      NOTE TO PATIENT: The 21st Century Cures Act makes clinical notes like these available to patients in the interest of transparency. Clinical notes are medical documents used by physicians and care providers to communicate with each other. These documents include medical language and terminology, abbreviations, and treatment information that may sound technical and at times possibly unfamiliar. In addition, at times, the verbiage may appear blunt or direct. These documents are one tool providers use to communicate relevant information and clinical opinions of the care providers in a way that allows common understanding of the clinical context.      Miriam Olivares MD    09/24/24 10:53 AM

## 2024-09-30 ENCOUNTER — PATIENT MESSAGE (OUTPATIENT)
Dept: FAMILY MEDICINE CLINIC | Facility: CLINIC | Age: 79
End: 2024-09-30

## 2024-10-01 NOTE — TELEPHONE ENCOUNTER
From: Baron Dejan  To: Miriam Olivares  Sent: 9/30/2024 1:03 PM CDT  Subject: my mom's ( Baron Wylie) back    Hi , I wanted to give you an update per your request.  Dr.Gregory Lynch, spine orthopedic surgeon examined my mom's MRI and said that scoliosis cannot be rectified, but the pain going down the leg can be reduced from microlaminotomy. Both AP[facility] and Dr. Lynch said this should be the last resort( due to her age) after trying medicine and therapy. Baron will be getting 16 sessions of PT ( started last week) and completed a dose of medrol dose jamison. I also have an appointment with pain clinic next month.      I want to ask is should she restart fosamax weekly, that was discontinued a year and half back due to her dental implant procedure a year ago? please let me know.

## 2024-10-08 ENCOUNTER — HOSPITAL ENCOUNTER (OUTPATIENT)
Dept: BONE DENSITY | Age: 79
Discharge: HOME | End: 2024-10-08
Attending: FAMILY MEDICINE
Payer: MEDICARE

## 2024-10-08 DIAGNOSIS — Z78.0 POSTMENOPAUSAL: ICD-10-CM

## 2024-10-08 PROCEDURE — 77080 DXA BONE DENSITY AXIAL: CPT | Performed by: FAMILY MEDICINE

## 2024-11-01 ENCOUNTER — MED REC SCAN ONLY (OUTPATIENT)
Dept: FAMILY MEDICINE CLINIC | Facility: CLINIC | Age: 79
End: 2024-11-01

## 2024-11-26 ENCOUNTER — OFFICE VISIT (OUTPATIENT)
Dept: FAMILY MEDICINE CLINIC | Facility: CLINIC | Age: 79
End: 2024-11-26
Payer: COMMERCIAL

## 2024-11-26 VITALS
RESPIRATION RATE: 18 BRPM | HEART RATE: 74 BPM | WEIGHT: 110 LBS | SYSTOLIC BLOOD PRESSURE: 120 MMHG | HEIGHT: 57.72 IN | BODY MASS INDEX: 23.09 KG/M2 | OXYGEN SATURATION: 98 % | TEMPERATURE: 98 F | DIASTOLIC BLOOD PRESSURE: 78 MMHG

## 2024-11-26 DIAGNOSIS — Z01.818 PREOP EXAMINATION: Primary | ICD-10-CM

## 2024-11-26 DIAGNOSIS — Z98.818 OTHER DENTAL PROCEDURE STATUS: ICD-10-CM

## 2024-11-26 PROCEDURE — 3074F SYST BP LT 130 MM HG: CPT | Performed by: FAMILY MEDICINE

## 2024-11-26 PROCEDURE — 3008F BODY MASS INDEX DOCD: CPT | Performed by: FAMILY MEDICINE

## 2024-11-26 PROCEDURE — 99213 OFFICE O/P EST LOW 20 MIN: CPT | Performed by: FAMILY MEDICINE

## 2024-11-26 PROCEDURE — 3078F DIAST BP <80 MM HG: CPT | Performed by: FAMILY MEDICINE

## 2024-11-26 PROCEDURE — 1159F MED LIST DOCD IN RCRD: CPT | Performed by: FAMILY MEDICINE

## 2024-11-26 NOTE — PROGRESS NOTES
Family Medicine Progress Note  ASSESSMENT AND PLAN:  Baron Wylie is a 79 year old female who is here for:     Baron was seen today for pre-op exam.    Diagnoses and all orders for this visit:    Preop examination    Other dental procedure status    Medical form for clearance signed and faxed.     The patient indicates understanding of these issues and agrees to the plan.    Miriam Olivares MD   11/26/24      CC: Pre-Op Exam    HPI:   Baron Wylie is a 79 year old female who presents for Pre-Op Exam     Procedure: multipe teet extraction on the upper jaw  Date 1/14/2025  Dr.Brown JIMENEZ    Needs medical clearance as patient was taking Fosamax, has not been on it for a year and as half.    ALLERGY:     Allergies as of 11/26/2024 - Review Complete 11/26/2024   Allergen Reaction Noted    Penicillins OTHER (SEE COMMENTS) and PALPITATIONS 01/09/2014     MEDICATIONS:     Current Outpatient Medications   Medication Sig Dispense Refill    amLODIPine 5 MG Oral Tab Take 1 tablet (5 mg total) by mouth daily. 90 tablet 1    levothyroxine 25 MCG Oral Tab Take 1 tablet (25 mcg total) by mouth every other day. BEFORE A MEAL 45 tablet 1    levothyroxine 50 MCG Oral Tab Take 1 tablet (50 mcg total) by mouth every other day. BEFORE A MEAL 135 tablet 1    Meloxicam 7.5 MG Oral Tab Take 1 tablet (7.5 mg total) by mouth daily. 30 tablet 2    Calcium Citrate 950 (200 Ca) MG Oral Tab       Estradiol 0.1 MG/GM Vaginal Cream Place 0.5 g vaginally 3 (three) times a week. 42.5 g 2    Vitamin D3 (VITAMIN D3) 1000 UNITS Oral Tab Take 1 tablet (1,000 Units total) by mouth daily.      MULTIVITAMINS OR TABS 1 TABLET DAILY        Past Medical History:    Esophageal reflux    GERD    HYPOTHYROIDISM    MENOPAUSE    OSTEOPENIA      Social History:  Social History     Socioeconomic History    Marital status:     Number of children: 3   Occupational History    Occupation: Retired    Tobacco Use    Smoking status: Never      Passive exposure: Never    Smokeless tobacco: Never   Vaping Use    Vaping status: Never Used   Substance and Sexual Activity    Alcohol use: No    Drug use: No   Other Topics Concern    Caffeine Concern Yes     Comment: coffee and tea    Exercise Yes     Social Drivers of Health     Food Insecurity: No Food Insecurity (6/15/2024)    Received from [facility]    Hunger Vital Sign     Worried About Running Out of Food in the Last Year: Never true     Ran Out of Food in the Last Year: Never true   Transportation Needs: Unknown (6/15/2024)    Received from [facility]    PRAPARE - Transportation     Lack of Transportation (Medical): No        REVIEW OF SYSTEMS:   GENERAL HEALTH: feels well otherwise  SKIN: denies any unusual skin lesions or rashes  RESPIRATORY: denies shortness of breath with exertion  CARDIOVASCULAR: denies chest pain on exertion  GI: denies abdominal pain and denies heartburn  MUSCULOSKELETAL: chronic back pain  NEURO: denies headaches    EXAM:   /78   Pulse 74   Temp 98 °F (36.7 °C) (Temporal)   Resp 18   Ht 4' 9.72\" (1.466 m)   Wt 110 lb (49.9 kg)   SpO2 98%   BMI 23.21 kg/m²   GENERAL: well developed, well nourished,in no apparent distress  SKIN: no rashes,no suspicious lesions  HEENT: atraumatic, normocephalic,ears and throat are clear  NECK: supple,no adenopathy,no bruits  LUNGS: clear to auscultation  CARDIO: RRR without murmur  GI: good BS's,no masses, HSM or tenderness  EXTREMITIES: no cyanosis, clubbing or edema    Ok to proceed with dental extraction as patient has been off fosamax for a year and a half, hold Meloxicam a week before extraction.    NOTE TO PATIENT: The 21st Century Cures Act makes clinical notes like these available to patients in the interest of transparency. Clinical notes are medical documents used by physicians and care providers to communicate with each other. These documents include medical  language and terminology, abbreviations, and treatment information that may sound technical and at times possibly unfamiliar. In addition, at times, the verbiage may appear blunt or direct. These documents are one tool providers use to communicate relevant information and clinical opinions of the care providers in a way that allows common understanding of the clinical context.      Miriam Olivares MD    11/26/24 1:09 PM

## 2024-12-26 ENCOUNTER — MED REC SCAN ONLY (OUTPATIENT)
Dept: FAMILY MEDICINE CLINIC | Facility: CLINIC | Age: 79
End: 2024-12-26

## 2025-03-09 DIAGNOSIS — I10 ESSENTIAL (PRIMARY) HYPERTENSION: ICD-10-CM

## 2025-03-11 DIAGNOSIS — I10 ESSENTIAL (PRIMARY) HYPERTENSION: ICD-10-CM

## 2025-03-12 RX ORDER — AMLODIPINE BESYLATE 5 MG/1
5 TABLET ORAL DAILY
Qty: 90 TABLET | Refills: 1 | OUTPATIENT
Start: 2025-03-12

## 2025-03-12 RX ORDER — AMLODIPINE BESYLATE 5 MG/1
5 TABLET ORAL DAILY
Qty: 90 TABLET | Refills: 0 | Status: SHIPPED | OUTPATIENT
Start: 2025-03-12

## 2025-03-12 NOTE — TELEPHONE ENCOUNTER
Please Review. Protocol Failed; No Protocol   GFR Evaluation  EGFRCR: 47 , resulted on 3/12/2024    Recent Visits  Date Type Provider Dept   11/26/24 Office Visit Miriam Olivares MD [facility] [address] [facility] [address]

## 2025-04-28 ENCOUNTER — TELEPHONE (OUTPATIENT)
Dept: FAMILY MEDICINE CLINIC | Facility: CLINIC | Age: 80
End: 2025-04-28

## 2025-05-05 ENCOUNTER — TELEPHONE (OUTPATIENT)
Dept: ADMINISTRATIVE | Age: 80
End: 2025-05-05

## 2025-05-05 DIAGNOSIS — Z01.10 ENCOUNTER FOR HEARING EXAMINATION, UNSPECIFIED WHETHER ABNORMAL FINDINGS: Primary | ICD-10-CM

## 2025-05-05 NOTE — TELEPHONE ENCOUNTER
Patient request  Referral  to see Audiology(Nathalia)please review and sign plan and care if you agree Thank you.                              Checo GOLDTSEIN            [facility]

## 2025-06-20 DIAGNOSIS — I10 ESSENTIAL (PRIMARY) HYPERTENSION: ICD-10-CM

## 2025-06-20 DIAGNOSIS — M25.511 ACUTE PAIN OF RIGHT SHOULDER: ICD-10-CM

## 2025-06-23 RX ORDER — MELOXICAM 7.5 MG/1
7.5 TABLET ORAL DAILY
Qty: 30 TABLET | Refills: 2 | Status: SHIPPED | OUTPATIENT
Start: 2025-06-23

## 2025-06-23 NOTE — TELEPHONE ENCOUNTER
Please review.  Protocol failed / Has no protocol.     Requested Prescriptions   Pending Prescriptions Disp Refills    MELOXICAM 7.5 MG Oral Tab [Pharmacy Med Name: MELOXICAM 7.5MG TABLETS] 30 tablet 2     Sig: TAKE 1 TABLET(7.5 MG) BY MOUTH DAILY       Non-Narcotic Pain Medication Protocol Passed - 6/23/2025  5:07 PM        Passed - In person appointment or virtual visit in the past 6 mos or appointment in next 3 mos        Passed - Medication is active on med list          AMLODIPINE 5 MG Oral Tab [Pharmacy Med Name: AMLODIPINE BESYLATE 5MG TABLETS] 90 tablet 0     Sig: TAKE 1 TABLET(5 MG) BY MOUTH DAILY       Hypertension Medications Protocol Failed - 6/23/2025  5:07 PM        Failed - CMP or BMP in past 12 months        Failed - EGFRCR or GFRNAA > 50        Passed - Last BP reading less than 140/90        Passed - In person appointment or virtual visit in the past 12 mos or appointment in next 3 mos        Passed - Medication is active on med list

## 2025-06-24 RX ORDER — AMLODIPINE BESYLATE 5 MG/1
5 TABLET ORAL DAILY
Qty: 90 TABLET | Refills: 0 | Status: SHIPPED | OUTPATIENT
Start: 2025-06-24 | End: 2025-06-30

## 2025-06-28 ENCOUNTER — MED REC SCAN ONLY (OUTPATIENT)
Dept: FAMILY MEDICINE CLINIC | Facility: CLINIC | Age: 80
End: 2025-06-28

## 2025-06-29 DIAGNOSIS — E03.9 HYPOTHYROIDISM, UNSPECIFIED TYPE: ICD-10-CM

## 2025-06-30 ENCOUNTER — OFFICE VISIT (OUTPATIENT)
Dept: FAMILY MEDICINE CLINIC | Facility: CLINIC | Age: 80
End: 2025-06-30
Payer: MEDICARE

## 2025-06-30 ENCOUNTER — LAB ENCOUNTER (OUTPATIENT)
Dept: LAB | Age: 80
End: 2025-06-30
Attending: FAMILY MEDICINE
Payer: MEDICARE

## 2025-06-30 VITALS
OXYGEN SATURATION: 98 % | WEIGHT: 108 LBS | DIASTOLIC BLOOD PRESSURE: 68 MMHG | SYSTOLIC BLOOD PRESSURE: 118 MMHG | TEMPERATURE: 97 F | HEIGHT: 57.87 IN | HEART RATE: 65 BPM | RESPIRATION RATE: 18 BRPM | BODY MASS INDEX: 22.67 KG/M2

## 2025-06-30 DIAGNOSIS — E03.9 HYPOTHYROIDISM, UNSPECIFIED TYPE: ICD-10-CM

## 2025-06-30 DIAGNOSIS — N81.10 FEMALE BLADDER PROLAPSE: ICD-10-CM

## 2025-06-30 DIAGNOSIS — Z13.220 SCREENING FOR LIPID DISORDERS: ICD-10-CM

## 2025-06-30 DIAGNOSIS — N18.31 STAGE 3A CHRONIC KIDNEY DISEASE (HCC): Chronic | ICD-10-CM

## 2025-06-30 DIAGNOSIS — M85.80 OSTEOPENIA, UNSPECIFIED LOCATION: ICD-10-CM

## 2025-06-30 DIAGNOSIS — D50.8 IRON DEFICIENCY ANEMIA SECONDARY TO INADEQUATE DIETARY IRON INTAKE: ICD-10-CM

## 2025-06-30 DIAGNOSIS — Z00.00 ENCOUNTER FOR ANNUAL HEALTH EXAMINATION: Primary | ICD-10-CM

## 2025-06-30 DIAGNOSIS — E55.9 VITAMIN D DEFICIENCY: ICD-10-CM

## 2025-06-30 DIAGNOSIS — M80.08XD PATHOLOGICAL FRACTURE OF VERTEBRA DUE TO AGE-RELATED OSTEOPOROSIS WITH ROUTINE HEALING, SUBSEQUENT ENCOUNTER: ICD-10-CM

## 2025-06-30 DIAGNOSIS — M41.9 SCOLIOSIS OF THORACOLUMBAR SPINE, UNSPECIFIED SCOLIOSIS TYPE: ICD-10-CM

## 2025-06-30 DIAGNOSIS — S12.390D: ICD-10-CM

## 2025-06-30 DIAGNOSIS — I10 ESSENTIAL (PRIMARY) HYPERTENSION: ICD-10-CM

## 2025-06-30 PROBLEM — J44.89 ASTHMA WITH COPD (CHRONIC OBSTRUCTIVE PULMONARY DISEASE) (HCC): Chronic | Status: RESOLVED | Noted: 2024-09-24 | Resolved: 2025-06-30

## 2025-06-30 LAB
ALBUMIN SERPL-MCNC: 5.1 G/DL (ref 3.2–4.8)
ALBUMIN/GLOB SERPL: 1.7 {RATIO} (ref 1–2)
ALP LIVER SERPL-CCNC: 89 U/L (ref 55–142)
ALT SERPL-CCNC: 19 U/L (ref 10–49)
ANION GAP SERPL CALC-SCNC: 10 MMOL/L (ref 0–18)
AST SERPL-CCNC: 36 U/L (ref ?–34)
BASOPHILS # BLD AUTO: 0.1 X10(3) UL (ref 0–0.2)
BASOPHILS NFR BLD AUTO: 1.7 %
BILIRUB SERPL-MCNC: 0.3 MG/DL (ref 0.2–1.1)
BUN BLD-MCNC: 8 MG/DL (ref 9–23)
CALCIUM BLD-MCNC: 10.1 MG/DL (ref 8.7–10.6)
CHLORIDE SERPL-SCNC: 100 MMOL/L (ref 98–112)
CHOLEST SERPL-MCNC: 206 MG/DL (ref ?–200)
CO2 SERPL-SCNC: 26 MMOL/L (ref 21–32)
CREAT BLD-MCNC: 0.83 MG/DL (ref 0.55–1.02)
DEPRECATED HBV CORE AB SER IA-ACNC: 34 NG/ML (ref 50–306)
EGFRCR SERPLBLD CKD-EPI 2021: 71 ML/MIN/1.73M2 (ref 60–?)
EOSINOPHIL # BLD AUTO: 0.13 X10(3) UL (ref 0–0.7)
EOSINOPHIL NFR BLD AUTO: 2.2 %
ERYTHROCYTE [DISTWIDTH] IN BLOOD BY AUTOMATED COUNT: 14.8 %
FASTING PATIENT LIPID ANSWER: NO
FASTING STATUS PATIENT QL REPORTED: NO
GLOBULIN PLAS-MCNC: 3 G/DL (ref 2–3.5)
GLUCOSE BLD-MCNC: 76 MG/DL (ref 70–99)
HCT VFR BLD AUTO: 38.2 % (ref 35–48)
HDLC SERPL-MCNC: 71 MG/DL (ref 40–59)
HGB BLD-MCNC: 12.1 G/DL (ref 12–16)
IMM GRANULOCYTES # BLD AUTO: 0.01 X10(3) UL (ref 0–1)
IMM GRANULOCYTES NFR BLD: 0.2 %
IRON SATN MFR SERPL: 19 % (ref 15–50)
IRON SERPL-MCNC: 74 UG/DL (ref 50–170)
LDLC SERPL CALC-MCNC: 109 MG/DL (ref ?–100)
LYMPHOCYTES # BLD AUTO: 2.28 X10(3) UL (ref 1–4)
LYMPHOCYTES NFR BLD AUTO: 39.3 %
MCH RBC QN AUTO: 25.9 PG (ref 26–34)
MCHC RBC AUTO-ENTMCNC: 31.7 G/DL (ref 31–37)
MCV RBC AUTO: 81.6 FL (ref 80–100)
MONOCYTES # BLD AUTO: 0.56 X10(3) UL (ref 0.1–1)
MONOCYTES NFR BLD AUTO: 9.7 %
NEUTROPHILS # BLD AUTO: 2.72 X10 (3) UL (ref 1.5–7.7)
NEUTROPHILS # BLD AUTO: 2.72 X10(3) UL (ref 1.5–7.7)
NEUTROPHILS NFR BLD AUTO: 46.9 %
NONHDLC SERPL-MCNC: 135 MG/DL (ref ?–130)
OSMOLALITY SERPL CALC.SUM OF ELEC: 279 MOSM/KG (ref 275–295)
PLATELET # BLD AUTO: 300 10(3)UL (ref 150–450)
POTASSIUM SERPL-SCNC: 4.3 MMOL/L (ref 3.5–5.1)
PROT SERPL-MCNC: 8.1 G/DL (ref 5.7–8.2)
RBC # BLD AUTO: 4.68 X10(6)UL (ref 3.8–5.3)
SODIUM SERPL-SCNC: 136 MMOL/L (ref 136–145)
TOTAL IRON BINDING CAPACITY: 381 UG/DL (ref 250–425)
TRANSFERRIN SERPL-MCNC: 304 MG/DL (ref 250–380)
TRIGL SERPL-MCNC: 150 MG/DL (ref 30–149)
VIT D+METAB SERPL-MCNC: 52.5 NG/ML (ref 30–100)
VLDLC SERPL CALC-MCNC: 26 MG/DL (ref 0–30)
WBC # BLD AUTO: 5.8 X10(3) UL (ref 4–11)

## 2025-06-30 PROCEDURE — 85025 COMPLETE CBC W/AUTO DIFF WBC: CPT

## 2025-06-30 PROCEDURE — 83540 ASSAY OF IRON: CPT

## 2025-06-30 PROCEDURE — 82306 VITAMIN D 25 HYDROXY: CPT

## 2025-06-30 PROCEDURE — 36415 COLL VENOUS BLD VENIPUNCTURE: CPT

## 2025-06-30 PROCEDURE — 82728 ASSAY OF FERRITIN: CPT

## 2025-06-30 PROCEDURE — 80061 LIPID PANEL: CPT

## 2025-06-30 PROCEDURE — 80053 COMPREHEN METABOLIC PANEL: CPT

## 2025-06-30 PROCEDURE — 83550 IRON BINDING TEST: CPT

## 2025-06-30 RX ORDER — ALENDRONATE SODIUM 70 MG/1
70 TABLET ORAL WEEKLY
COMMUNITY

## 2025-06-30 RX ORDER — AMLODIPINE BESYLATE 5 MG/1
5 TABLET ORAL DAILY
Qty: 90 TABLET | Refills: 2 | Status: SHIPPED | OUTPATIENT
Start: 2025-06-30

## 2025-06-30 NOTE — ASSESSMENT & PLAN NOTE
Osteopenia stable on DEXA done last year  - off Fosamax for 1 year was on it for 9 years  - with history of vertebral fractures and current cervical fracture referred to endocrine to discuss alternate treatment.  Orders:    Endocrine Referral - In Network

## 2025-06-30 NOTE — ASSESSMENT & PLAN NOTE
Currently asymptomatic  - encouraged to repeat labs  Orders:    Ferritin; Future; Expected date: 12/14/2025    Iron And Tibc; Future; Expected date: 12/14/2025    CBC With Differential With Platelet; Future; Expected date: 12/14/2025

## 2025-06-30 NOTE — PROGRESS NOTES
Assessment & Plan  Encounter for annual health examination         Osteopenia, unspecified location  Osteopenia stable on DEXA done last year  - off Fosamax for 1 year was on it for 9 years  - with history of vertebral fractures and current cervical fracture referred to endocrine to discuss alternate treatment.  Orders:    Endocrine Referral - In Network    Hypothyroidism, unspecified type  Well controlled with the current dose of medication, 50 mcg alternating with 25 mcg  - recheck labs  - continue the same dose of medication  Orders:    TSH and Free T4; Future; Expected date: 12/14/2025    Iron deficiency anemia secondary to inadequate dietary iron intake  Currently asymptomatic  - encouraged to repeat labs  Orders:    Ferritin; Future; Expected date: 12/14/2025    Iron And Tibc; Future; Expected date: 12/14/2025    CBC With Differential With Platelet; Future; Expected date: 12/14/2025    Female bladder prolapse  S/p colpocleisis and posterior repair in 5/2024  - currently asymptomatic       Stage 3a chronic kidney disease (HCC)  Likely secondary to chronic hypertension  - stable  - limit salt in diet  - Avoid NSAID's       Compression fracture of C4 vertebra with routine healing, subsequent encounter  Stable C4 compression fracture for three months, no neurologic involvement. Kyphoplasty considered but likely too late. Awaiting MRI results for further management.  - Order MRI on July 2.  - Consult with orthopedic physician Dr. Lynch next week.  - Evaluate MRI results to determine need for bracing.  Orders:    Endocrine Referral - In Network    Vitamin D deficiency    Orders:    Vitamin D; Future; Expected date: 12/14/2025    Essential (primary) hypertension  Well controlled with the current dose of medication  -  continue the same dose of medication  -  DASH diet, limit salt to less than 2000 mg  -  Goal BP less than 130/80    Orders:    Comp Metabolic Panel (14); Future; Expected date: 12/14/2025    amLODIPine  Besylate; Take 1 tablet (5 mg total) by mouth daily.  Dispense: 90 tablet; Refill: 2    Screening for lipid disorders    Orders:    Lipid Panel; Future; Expected date: 12/14/2025    Scoliosis of thoracolumbar spine, unspecified scoliosis type  Patient does not have pain but her posture has worsened and uses a cane for ambulating  - f/u with ortho to discuss management       Pathological fracture of vertebra due to age-related osteoporosis with routine healing, subsequent encounter  Previous fracture of thoracic and lumbar vertebra.  - referred to endocrine for osteopenia treatment..           Health Maintenance   Topic Date Due    Zoster Vaccines (3 of 3) 11/16/2020    COVID-19 Vaccine (6 - 2024-25 season) 09/01/2024    Annual Well Visit  01/01/2025    Influenza Vaccine  Completed    DEXA Scan  Completed    Annual Depression Screening  Completed    Fall Risk Screening (Annual)  Completed    Pneumococcal Vaccine: 50+ Years  Completed    Meningococcal B Vaccine  Aged Out     The patient indicates understanding of these issues and agrees to the plan.  Reinforced healthy diet, lifestyle, and exercise.      Return in 1 year (on 6/30/2026).     Miriam Olivares MD, 6/30/2025     Chief Complaint   Patient presents with    Wellness Visit        The following individual(s) verbally consented to be recorded using ambient AI listening technology and understand that they can each withdraw their consent to this listening technology at any point by asking the clinician to turn off or pause the recording:    Patient name: Baron Wylie      Subjective:   Baron Wylie is a 80 year old female who presents for a MA AHA (Medicare Advantage Annual Health Assessment) and Subsequent Annual Wellness visit (Pt already had Initial Annual Wellness) and scheduled follow up of multiple significant but stable problems and acute complicated new problem.   History of Present Illness  Ms. Baron Wylie is an 80-year-old female  with a history of C4 compression fracture who presents for follow-up on her spinal condition and osteoporosis management.    She has a C4 compression fracture diagnosed after an X-ray by an orthopedic spine specialist. The fracture was discovered approximately three months after the initial incident, which occurred when she bent down to pick something up. An MRI is scheduled for July 2, 2025. She has not been using a brace consistently unless instructed by a physician.    She has a history of osteoporosis and was on Fosamax for about ten years, which was stopped approximately a year and a half ago. Her bone density showed osteopenia, with a femoral neck T-score of -2.2. She experienced a worsening in bone density in 2021, but it improved slightly afterward.    She experiences shortness of breath, particularly when going up and down stairs. She does not use inhalers and has no history of asthma. Her scoliosis appears to be worsening, affecting her posture and gait, causing her to bend forward and to the side when walking.    She underwent a bladder lift surgery a year ago and has not required the use of a pessary since. No bladder symptoms post-surgery.    She experiences constipation, managed with Metamucil, but reports bowel movements every two days. She drinks about half a gallon of water daily and finds that eating fruits helps with bowel regularity.    She reports shoulder pain, which has been a long-standing issue, and occasionally takes meloxicam for pain relief, though she is reluctant to use it regularly. No numbness or tingling in her legs.    Her hearing is impaired, and she uses a hearing aid. She reports occasional discomfort with the hearing aid but no earwax issues.    History/Other:   Fall Risk Assessment:   She has been screened for Falls and is low risk.      Cognitive Assessment:   She had a completely normal cognitive assessment - see flowsheet entries     Functional Ability/Status:   Baron  Dejan has some abnormal functions as listed below:  She has Driving difficulties based on screening of functional status. She has Meal Preparation difficulties based on screening of functional status.She has difficulties Shopping for Groceries based on screening of functional status. She has Hearing problems based on screening of functional status.      Depression Screening (PHQ):  PHQ-2 SCORE: 0  , done 6/30/2025   Last [place] Suicide Screening on 6/30/2025 was No Risk.          Advanced Directives:   She does have a Living Will but we do NOT have it on file in Epic.    She has a Power of  for Health Care on file in [employer]  Patient has Advance Care Planning documents but we do not have a copy in EMR. Discussed Advanced Care Planning with patient and instructed patient to get our office a copy to be scanned into EMR.      Patient Active Problem List   Diagnosis    Osteopenia    Hypothyroidism    Hearing difficulty of both ears    Iron deficiency anemia    Female bladder prolapse    Essential hypertension, benign    Chronic midline low back pain without sciatica    Blurred vision, right eye    Postmenopausal atrophic vaginitis    Acute pain of right shoulder    Pelvic floor relaxation    Midline cystocele    CKD (chronic kidney disease) stage 3, GFR 30-59 ml/min ([facility])     Allergies:  She is allergic to penicillins.    Current Medications:  Outpatient Medications Marked as Taking for the 6/30/25 encounter (Office Visit) with Miriam Olivares MD   Medication Sig    amLODIPine 5 MG Oral Tab Take 1 tablet (5 mg total) by mouth daily.    Meloxicam 7.5 MG Oral Tab Take 1 tablet (7.5 mg total) by mouth daily.    levothyroxine 25 MCG Oral Tab Take 1 tablet (25 mcg total) by mouth every other day. BEFORE A MEAL    levothyroxine 50 MCG Oral Tab Take 1 tablet (50 mcg total) by mouth every other day. BEFORE A MEAL    Calcium Citrate 950 (200 Ca) MG Oral Tab     Estradiol 0.1 MG/GM Vaginal Cream Place 0.5 g  vaginally 3 (three) times a week.    Vitamin D3 (VITAMIN D3) 1000 UNITS Oral Tab Take 1 tablet (1,000 Units total) by mouth daily.    MULTIVITAMINS OR TABS 1 TABLET DAILY       Medical History:  She  has a past medical history of Arthritis, Cervical stress fracture, Esophageal reflux, GERD, HYPOTHYROIDISM, Hypothyroidism, MENOPAUSE (01/01/1993), Osteoarthritis, and OSTEOPENIA.  Surgical History:  She  has a past surgical history that includes other surgical history; other surgical history; and cataract (Bilateral, 2010).   Family History:  Her family history includes Cancer in her father and another family member; Diabetes in her father, mother, paternal aunt, paternal grandfather, paternal grandmother, and son; Heart Disease in her mother; Heart Disorder in her mother; Hypertension in her mother.  Social History:  She  reports that she has never smoked. She has never been exposed to tobacco smoke. She has never used smokeless tobacco. She reports that she does not drink alcohol and does not use drugs.    Tobacco:  She has never smoked tobacco.    CAGE Alcohol Screen:   CAGE screening score of 0 on 6/30/2025, showing low risk of alcohol abuse.      Patient Care Team:  Miriam Olivares MD as PCP - General (Family Medicine)  Edna Traore PT as Physical Therapist  Marilu Gandhi MD as Consulting Physician (GASTROENTEROLOGY)  Gillian Harding, PT as Physical Therapist    Review of Systems   Constitutional:  Negative for appetite change, fatigue, fever and unexpected weight change.   HENT:  Negative for congestion, ear pain, hearing loss and sore throat.    Eyes:  Negative for discharge, redness and visual disturbance.   Respiratory:  Negative for cough, chest tightness and shortness of breath.    Cardiovascular:  Negative for chest pain and palpitations.   Gastrointestinal:  Positive for constipation. Negative for abdominal pain, blood in stool and nausea.   Endocrine: Negative for cold intolerance, heat  intolerance and polyuria.   Genitourinary:  Negative for difficulty urinating, dysuria, frequency and urgency.   Musculoskeletal:  Positive for back pain and neck pain. Negative for arthralgias, gait problem, joint swelling and myalgias.   Skin:  Negative for rash.   Allergic/Immunologic: Negative for food allergies.   Neurological:  Negative for dizziness, weakness, numbness and headaches.   Hematological:  Negative for adenopathy. Does not bruise/bleed easily.   Psychiatric/Behavioral:  Negative for dysphoric mood and sleep disturbance. The patient is not nervous/anxious.           Objective:   Physical Exam  Constitutional:       General: She is not in acute distress.     Appearance: Normal appearance. She is well-developed and normal weight.   HENT:      Head: Normocephalic and atraumatic.      Right Ear: Tympanic membrane, ear canal and external ear normal.      Left Ear: Tympanic membrane, ear canal and external ear normal.      Nose: Nose normal.      Mouth/Throat:      Mouth: Mucous membranes are moist.      Pharynx: Oropharynx is clear.   Eyes:      Extraocular Movements: Extraocular movements intact.      Conjunctiva/sclera: Conjunctivae normal.      Pupils: Pupils are equal, round, and reactive to light.   Neck:      Thyroid: No thyromegaly.   Cardiovascular:      Rate and Rhythm: Normal rate and regular rhythm.      Pulses: Normal pulses.      Heart sounds: Normal heart sounds. No murmur heard.  Pulmonary:      Effort: Pulmonary effort is normal. No respiratory distress.      Breath sounds: Normal breath sounds.   Chest:      Chest wall: No tenderness.   Abdominal:      General: Bowel sounds are normal. There is no distension.      Palpations: Abdomen is soft. There is no hepatomegaly, splenomegaly or mass.      Tenderness: There is no abdominal tenderness.      Hernia: No hernia is present.   Musculoskeletal:         General: Normal range of motion.      Cervical back: Normal range of motion and neck  supple. Tenderness present. No swelling. Pain with movement present.      Lumbar back: Scoliosis present.      Right lower leg: No edema.      Left lower leg: No edema.   Lymphadenopathy:      Cervical: No cervical adenopathy.   Skin:     General: Skin is warm.      Findings: No rash.   Neurological:      General: No focal deficit present.      Mental Status: She is alert and oriented to person, place, and time.      Cranial Nerves: No cranial nerve deficit.      Sensory: No sensory deficit.      Motor: No weakness.      Coordination: Coordination normal.      Gait: Gait normal.      Deep Tendon Reflexes: Reflexes are normal and symmetric. Reflexes normal.   Psychiatric:         Mood and Affect: Mood normal.         Behavior: Behavior normal.         Thought Content: Thought content normal.         Judgment: Judgment normal.       /68   Pulse 65   Temp 97.2 °F (36.2 °C) (Temporal)   Resp 18   Ht 4' 9.87\" (1.47 m)   Wt 108 lb (49 kg)   SpO2 98%   BMI 22.67 kg/m²  Estimated body mass index is 22.67 kg/m² as calculated from the following:    Height as of this encounter: 4' 9.87\" (1.47 m).    Weight as of this encounter: 108 lb (49 kg).    Medicare Hearing Assessment:   Hearing Screening    Time taken: 6/30/2025 12:53 PM  Entry User: Aspen Sims CMA  Screening Method: Finger Rub  Finger Rub Result: Fail (Comment: has hearing aides)         Visual Acuity:   Right Eye Visual Acuity: Uncorrected Right Eye Chart Acuity: 20/40   Left Eye Visual Acuity: Uncorrected Left Eye Chart Acuity: 20/40   Both Eyes Visual Acuity: Uncorrected Both Eyes Chart Acuity: 20/40   Able To Tolerate Visual Acuity: Yes          Supplementary Documentation:   General Health:  In the past six months, have you lost more than 10 pounds without trying?: 2 - No  Has your appetite been poor?: No  Type of Diet: Balanced  How does the patient maintain a good energy level?: Daily Walks  How would you describe your daily physical  activity?: Light  How would you describe your current health state?: Fair  How do you maintain positive mental well-being?:  (pray)  On a scale of 0 to 10, with 0 being no pain and 10 being severe pain, what is your pain level?: 4 - (Moderate)  In the past six months, have you experienced urine leakage?: 0-No  At any time do you feel concerned for the safety/well-being of yourself and/or your children, in your home or elsewhere?: No  Have you had any immunizations at another office such as Influenza, Hepatitis B, Tetanus, or Pneumococcal?: No       The 21st Century Cures Act makes medical notes like these available to patients in the interest of transparency. Please be advised this is a medical document. Medical documents are intended to carry relevant information, facts as evident, and the clinical opinion of the practitioner. The medical note is intended as peer to peer communication and may appear blunt or direct. It is written in medical language and may contain abbreviations or verbiage that are unfamiliar.

## 2025-06-30 NOTE — ASSESSMENT & PLAN NOTE
Well controlled with the current dose of medication, 50 mcg alternating with 25 mcg  - recheck labs  - continue the same dose of medication  Orders:    TSH and Free T4; Future; Expected date: 12/14/2025

## 2025-07-02 RX ORDER — LEVOTHYROXINE SODIUM 25 UG/1
25 TABLET ORAL EVERY OTHER DAY
Qty: 45 TABLET | Refills: 1 | Status: SHIPPED | OUTPATIENT
Start: 2025-07-02

## 2025-07-02 RX ORDER — LEVOTHYROXINE SODIUM 50 UG/1
50 TABLET ORAL EVERY OTHER DAY
Qty: 135 TABLET | Refills: 1 | Status: SHIPPED | OUTPATIENT
Start: 2025-07-02

## 2025-08-06 ENCOUNTER — PATIENT MESSAGE (OUTPATIENT)
Dept: FAMILY MEDICINE CLINIC | Facility: CLINIC | Age: 80
End: 2025-08-06

## 2025-08-06 DIAGNOSIS — M80.00XD AGE-RELATED OSTEOPOROSIS WITH CURRENT PATHOLOGICAL FRACTURE WITH ROUTINE HEALING, SUBSEQUENT ENCOUNTER: Primary | ICD-10-CM

## (undated) DIAGNOSIS — M62.830 SPASM OF MUSCLE OF LOWER BACK: Primary | ICD-10-CM

## (undated) NOTE — LETTER
05/10/21        Jenifer Drew  [address] [facility] [address]      Dear Colette Murray,    [address] [facility] records indicate that you have outstanding lab work and or testing that was ordered for you and has not yet been completed:  Orders Placed This

## (undated) NOTE — MR AVS SNAPSHOT
[facility]  [address]  [address] [place] [address]  [phone]               Thank you for choosing us for your health care visit with Alka Triplett.   We are glad to serve you and happy to provide you with this summar __x___Miralax    Miralax is a laxative available over the counter. It can be used on a long-term basis if necessary. The usual starting dose is 1 capful of powder mixed in fluid each morning.   The dose can be adjusted up or down depending on results and OSCAL 500/200 D-3 500-200 MG-UNIT Tabs   Generic drug:  Calcium Carbonate-Vitamin D   1 TABLET DAILY           psyllium 28 % Pack   Take 1 packet by mouth 2 (two) times daily.    Commonly known as:  METAMUCIL SMOOTH TEXTURE           Vitamin D3 1000 units

## (undated) NOTE — LETTER
04/18/19        Mayda Brown  [address] [facility] [address]      Dear Gill Armando,    Our records indicate that you have outstanding lab work and or testing that was ordered for you and has not yet been completed:  Orders Placed This Encou

## (undated) NOTE — MR AVS SNAPSHOT
[address]  [address]  [address] [place] [address]  [phone]               Thank you for choosing us for your health care visit with Hortencia Fay.   We are glad to serve you and happy to provide you with this summar Begin with 1 dose (as instructed on the package) every morning. If the stool is too hard, or if the stool consists of small, hard, marbles, you may need to increase to 1 dose each morning and 1 dose each evening.     If you find it difficult to take the 1 TABLET DAILY           psyllium 28 % Pack   Take 1 packet by mouth 2 (two) times daily. Commonly known as:  METAMUCIL SMOOTH TEXTURE           Vitamin D3 1000 units Tabs   Take 1,000 Units by mouth daily.    Commonly known as:  VITAMIN D3

## (undated) NOTE — LETTER
01/02/18        Sreekanth Babb  [address] [facility] [address]      Dear Tamela Yuen,    [address] [facility] records indicate that you have outstanding lab work and or testing that was ordered for you and has not yet been completed:             Occult Blood,

## (undated) NOTE — MR AVS SNAPSHOT
[facility] [address] [address]  [phone]               Thank you for choosing us for your health care visit with Vinnie Aguirre MD.  We are glad to serve you and happy to provide you with this Follow-up Instructions     Return in 6 months (on 12/21/2017). Scheduling Instructions     Wednesday June 21, 2017     Imaging:  TRINY SCREENING BILAT ([id number]=[id number])    Instructions:   To schedule an appointment for your radiology test please call [facility] Follow-Up           Medical Issues Discussed Today     Female bladder prolapse    Hearing difficulty of both ears    Iron deficiency anemia    Encounter for annual health examination    -  Primary    Screening for cardiovascular condition EKG - covered if needed at Welcome to Medicare, and non-screening if indicated for medical reasons Electrocardiogram date09/01/2016 Routine EKG is not a screening covered service except at the Welcome to Medicare Visit    Abdominal aortic aneurysm screeni every 2 yrs up to age 79 or Yearly if High Risk   There are no preventive care reminders to display for this patient. Chlamydia  Annually if high risk No results found for: CHLAMYDIA No flowsheet data found.     Screening Mammogram      Mammogram    Rec An information packet, including necessary form from the [employer] website. http://www. [place].state. [place]/public/books/advin.htm  A link to the [employer].  This site has a lot of good information including definit Commonly known as:  METAMUCIL SMOOTH TEXTURE           Vitamin D3 1000 units Tabs   Take 1,000 Units by mouth daily.    Commonly known as:  VITAMIN D3                Where to Get Your Medications      These medications were sent to [address] [place] [address]

## (undated) NOTE — LETTER
03/12/18        Manish Ford  [address] [facility] [address]      Dear Anjali Rudolph,    [address] [facility] records indicate that you have outstanding lab work and or testing that was ordered for you and has not yet been completed:          Lipid Panel

## (undated) NOTE — MR AVS SNAPSHOT
[address]  [address]  [address] [place], [place] [id number]  [phone]               Thank you for choosing us for your health care visit with Damaris Masters.   We are glad to serve you and happy to provide you with this summar Commonly known as:  METAMUCIL SMOOTH TEXTURE           Vitamin D3 1000 UNITS Tabs   Take 1,000 Units by mouth daily.    Commonly known as:  VITAMIN D3                   MyChart     Visit MyChart  You can access your MyChart to more actively manage your heal increments are effective and add up over the week   2 ½ hours per week – spread out over time Use a lauren to keep you motivated   Don’t forget strength training with weights and resistance Set goals and track your progress   You don’t need to join a gym.